# Patient Record
Sex: MALE | Race: WHITE | Employment: FULL TIME | URBAN - METROPOLITAN AREA
[De-identification: names, ages, dates, MRNs, and addresses within clinical notes are randomized per-mention and may not be internally consistent; named-entity substitution may affect disease eponyms.]

---

## 2020-08-11 ENCOUNTER — OFFICE VISIT (OUTPATIENT)
Dept: ORTHOPEDIC SURGERY | Age: 25
End: 2020-08-11
Payer: COMMERCIAL

## 2020-08-11 VITALS — HEIGHT: 72 IN | BODY MASS INDEX: 29.47 KG/M2 | WEIGHT: 217.6 LBS

## 2020-08-11 PROCEDURE — L3670 SO ACRO/CLAV CAN WEB PRE OTS: HCPCS | Performed by: ORTHOPAEDIC SURGERY

## 2020-08-11 PROCEDURE — 99203 OFFICE O/P NEW LOW 30 MIN: CPT | Performed by: ORTHOPAEDIC SURGERY

## 2020-08-11 RX ORDER — DICLOFENAC SODIUM 75 MG/1
75 TABLET, DELAYED RELEASE ORAL 2 TIMES DAILY
Qty: 60 TABLET | Refills: 0 | Status: SHIPPED | OUTPATIENT
Start: 2020-08-11 | End: 2020-09-03 | Stop reason: ALTCHOICE

## 2020-08-11 RX ORDER — METHYLPREDNISOLONE 4 MG/1
TABLET ORAL
COMMUNITY
Start: 2020-08-06 | End: 2020-09-03 | Stop reason: ALTCHOICE

## 2020-08-11 NOTE — PROGRESS NOTES
SHOULDER CONSULTATION    Referring Provider: Self-referred    Primary Care Provider: Not listed    Chief Complaint    Pain (Left shoulder - catcher, stopped runner 2 years ago)      History of Present Illness:  Christina Brannon is a 27 y.o. male who presents today with a 2 years of a quite severe recalcitrant left shoulder pain. Began after high-energy collision while playing catcher at [employer]. He has a feelings of instability. Catching and crepitation. Post activity soreness. Difficulty positioning to comfort at night. Symptoms affecting simply activities of daily living. Primarily in anterior referred pattern of pain. Apprehension with abduction external rotation. No neurologic symptoms. Pain Assessment  Location of Pain: Shoulder  Location Modifiers: Left  Severity of Pain: 9  Quality of Pain: Popping, Dull, Aching  Duration of Pain: Persistent  Frequency of Pain: Intermittent  Date Pain First Started: (2 years)  Aggravating Factors: Bending, Exercise  Limiting Behavior: Yes  Result of Injury: Yes  Work-Related Injury: No  Are there other pain locations you wish to document?: No    Medical History:  Patient's medications, allergies, past medical, surgical, social and family histories were reviewed and updated as appropriate. Review of Systems:  Pertinent items are noted in HPI  Review of systems reviewed from Patient History Form dated on August 11 and available in the patient's chart under the Media tab. Vitals:    08/11/20 0919   Weight: 217 lb 9.6 oz (98.7 kg)   Height: 6' (1.829 m)           General Exam:   Constitutional: Patient is adequately groomed with no evidence of malnutrition  Mental Status: The patient is oriented to time, place and person. The patient's mood and affect are appropriate. Vascular: Examination reveals no swelling or calf tenderness. Peripheral pulses are palpable and 2+. Neurological: The patient has good coordination.   There is no weakness or and fit on 8/11/2020. The patient was educated and fit by a healthcare professional with expert knowledge and specialization in brace application while under the direct supervision of the treating physician. Verbal and written instructions for the use of and application of this item were provided. They were instructed to contact the office immediately should the brace result in increased pain, decreased sensation, increased swelling or worsening of the condition. Treatment Plan: I recommended that he obtain an MRI to better delineate his specific intra-articular pathology as this will help direct our pattern of care. My clinical suspicion is that he is going to require arthroscopy with debridement, removal of loose bodies and capsule labral repair. We appreciate the opportunity to care for this patient. The trust that is implicit in this referral does not go unnoticed. We will do our very best to provide high-quality care that goes above and beyond standards. Please feel free contact us with any questions or concerns about this patient. [facility] of [facility] and Sports Medicine Surgery     This dictation was performed with a verbal recognition program (DRAGON) and it was checked for errors. It is possible that there are still dictated errors within this office note. If so, please bring any errors to my attention for an addendum. All efforts were made to ensure that this office note is accurate.

## 2020-08-12 ENCOUNTER — TELEPHONE (OUTPATIENT)
Dept: ORTHOPEDIC SURGERY | Age: 25
End: 2020-08-12

## 2020-08-12 NOTE — TELEPHONE ENCOUNTER
LM FOR PATIENT REGARDING MRI. MRI APPROVED ([address]) AND READY TO BE SCHEDULED AT [facility]. PATIENT IS AWARE TO CALL TO MAKE AN APPT TO REVIEW THE RESULTS.

## 2020-08-24 ENCOUNTER — HOSPITAL ENCOUNTER (OUTPATIENT)
Dept: MRI IMAGING | Age: 25
Discharge: HOME | End: 2020-08-24
Payer: COMMERCIAL

## 2020-08-24 PROCEDURE — 73221 MRI JOINT UPR EXTREM W/O DYE: CPT

## 2020-08-28 ENCOUNTER — OFFICE VISIT (OUTPATIENT)
Dept: ORTHOPEDIC SURGERY | Age: 25
End: 2020-08-28
Payer: COMMERCIAL

## 2020-08-28 VITALS — WEIGHT: 217 LBS | BODY MASS INDEX: 29.39 KG/M2 | HEIGHT: 72 IN

## 2020-08-28 PROCEDURE — 99214 OFFICE O/P EST MOD 30 MIN: CPT | Performed by: ORTHOPAEDIC SURGERY

## 2020-08-28 RX ORDER — TRAMADOL HYDROCHLORIDE 50 MG/1
50 TABLET ORAL EVERY 8 HOURS PRN
Qty: 18 TABLET | Refills: 0 | Status: SHIPPED | OUTPATIENT
Start: 2020-08-28 | End: 2020-09-04

## 2020-08-28 NOTE — LETTER
[address] [place] [address]              [x] [phone] [facility]  Fax# [phone]                                                     [] [facility]#[phone]                                      [][facility] [address] [place] [address]#[phone]    Scheduled Date: 9/10/2020    Scheduled Time: 1:45pm      Time Needed: 60min    Patient Information:      Name: Steffi Latham      YOB: 1995      SSN:         Gender:  male                            Address: [address] [place] [address]   Phone Number: [phone] (home)     Insurance:  Payor: Billlibby Lose / Plan: Billey Lose NAP CHOICE POS II / Product Type: *No Product type* /     Primary Care Physician:  No primary care provider on file. H&P To Be Completed By: Dr. Socorro Whitt Needed? [] Yes [x] No   Pacemaker/Cardiac Implant? [] Yes [x] No    Surgical Procedure: Left shoulder arthroscopy with anterior labral reconstruction and loose body removal   CPT Code:17037, [id number]    Pre- Op Diagnosis:   1. Shoulder instability, left      Diagnosis Code: U25.902    Rep: Arthrex     Notified: [x] Yes [] No    Special Equipment: Lateral position      [] Mini C-ARM   [] Large  C-ARM     Patient Status:   [x] Outpatient         [] Same Day Admission      [] In- Patient          []Day Admit    Anesthesia Type:    [x] General         [] MAC       [] IV Regional          [] Local          [x] ISB Block      Allergies:  No Known Allergies  Latex: [] Yes [x] No     Vitals:    20 0745   Weight: 217 lb (98.4 kg)   Height: 6' (1.829 m)                                                                  PRE-SURGICAL PHYSICIAN ORDERS    Patient Name Steffi Latham     1995       Allergies Patient has no known allergies.             Pre-surgery Testing Orders:   [] Pre-surgical Anesthesia Orders Per Anesthesiologist    Preop Antibiotic Prophylaxis / DAY OF SURGERY Ancef  IVPB per weight base protocol prior to surgery. Ancef 2 grams if less than or equal to 119.9 kg (263 lbs). Ancef 3 grams if greater than or equal to 120 kg ( 264 lbs ). Pediatric patients (less than 40 kg / 90 lbs) Ancef 30 mg/kg. If allergic to PCN or Cephalosporins give:    Clindamycin 900 mg IVPB          If allergic to PCN or Cephalosporins, MRSA positive, or over age 72 give. Vancomycin 1 gram IVPB if 176 lbs ( 80 kg ) or less. Vancomycin 1.5 grams  IVPB if 177-264 lbs ( 81 - 120 kg ). Vancomycin 2 grams IVPB if over 264 lbs ( over 120 kg ). If allergic to Cephalosporins,PCN and Vancomycin give Cleocin 900 mg IVPB. ADDITIONAL MEDICATIONS:   [x] Acetaminophen 1000 mg by mouth 1 hour prior to procedure   [x] Celebrex 400 mg po 1 hour prior to procedure (18+)     []  EXPAREL 1.3%  20 cc  Subcutaneous    **[place] AND DELIA ONLY**  Ortho Irrigation: Bacitracin 50,999 units and Polymixin B 500,000 unites mixed in a syringe.  OR to mix with 500 cc NS and use 200 cc for irrigation (FOR ALL PATIENTS WHO REQUIRED METAL IMPLANT OR GRAFT)   DVT PREVENTION:  [x]  Knee high BRAULIO Hose           Signature                                                 Date 8/28/20 10:09 AM                                             Irving Ramirez M.D                                                                                                 Scheduled By:  Michela Maguire 8/28/20   Direct Tel: [phone]                     Direct Fax: [phone]

## 2020-08-28 NOTE — PROGRESS NOTES
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 History of instability episodes and high-energy collisions. Presented with feelings of instability crepitation and pain. MRI to evaluate capsule labral pathology and possible loose bodies. He reports that the MRI was quite difficult. He has been in significant pain up to 8 out of 10. He is quite apprehensive on movement. Previously seen on August 11      Objective:     @IPVITALS(24)@    Review of Systems  Pertinent items are noted in HPI  Denies fever, chills, confusion, bowel/bladder active change. Review of systems reviewed from Patient History Form dated on August 28 and available in the patient's chart under the Media tab. Pain Assessment  Location of Pain: Shoulder  Location Modifiers: Left  Severity of Pain: 9  Quality of Pain: Dull, Aching, Throbbing  Duration of Pain: Persistent  Frequency of Pain: Constant    Exam: On exam today he continues to demonstrate positive apprehension, positive dynamic labral shear. Positive Jobes and [place]'s tests. Imaging: Careful review of his MRI is somewhat limited by motion artifact and clarity. There does appear to be a suggestion of some anterior capsule labral step-off not mentioned by the radiologist.  There is some inflammatory signal in the axilla. Difficult to follow the course of the intra-articular long head biceps but a linear signal in the superior labrum was appreciated. No rotator cuff pathology. No other osseous pathology    Office Procedures:      Assessment:     27-year-old gentleman with a history of high-energy collision, overhead athletics and significant instability mechanical symptoms of the shoulder. Underwhelming MRI however limited by motion artifact. Plan:      1: We had a quite lengthy visit today about this clinical situation.   It is clear that Debbie Streeter has a considerable pain and apprehension. He reports quite classical mechanical symptoms. At this point based upon the clinical presentation I think we have 2 options    The first would be an aggressive program of anti-inflammatory management and glenohumeral stabilization therapy    The second would be a diagnostic and therapeutic arthroscopy with repair of the capsule labral structures, removal of likely loose body, potential biceps tenodesis    Based upon the intensity and duration of symptoms. 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 We discussed the procedure in great detail as well as risks benefits and alternatives. We discussed the postoperative rehabilitation as well as specifically the possible persistence of pain    Informed consent was discussed with the patient. This included a detailed description of the procedure. Risks, benefits and alternatives specific to this diagnosis and procedure were outlined. Standard surgical risks of anesthesia, bleeding, nerve damage, infection, need for further surgeries, disability and death also outlined. Verbal confirmation of informed consent was obtained. Signature obtained by the staff. [facility] face to face with the patient discussing and answering questions regarding the risks, benefits, and complications of capsule labral surgery in detail. We talked about the importance of postoperative therapy         Follow-Up Visit: Postoperatively            [facility] Partner of [facility] and Sports Medicine Surgery      This dictation was performed with a verbal recognition program (DRAGON) and it was checked for errors.  It is possible that there are still dictated errors within this office note. If so, please bring any errors to my attention for an addendum. All efforts were made to ensure that this office note is accurate.

## 2020-08-28 NOTE — LETTER
[address] [facility] Sports Medicine   Surgery Precert & Billing Form:    DEMOGRAPHICS:                                                                                                       Patient Name:  Idalmis Blair  Patient :  1995   Patient SS#:      Patient Phone:  [phone] (home)  Alt. Patient Phone:    Patient Address:  [address] [facility] [address]    PCP:  No primary care provider on file. Insurance: Payor: Linda Law / Plan: Linda Law NAP CHOICE POS II / Product Type: *No Product type* /     DIAGNOSIS & PROCEDURE:                                                                                      Diagnosis:   1.  Shoulder instability, left      Diagnosis Code:  T24.261      Operation: left    SURGERY  INFORMATION  Date of Surgery:   20  Location:  [facility]   Type:    Outpatient  23 hour hold:  No   Surgeon:              Ramo Bardales MD      20     BILLING INFORMATION:                                                                                                Physician Procedure                                            CPT Codes        Left shoulder arthroscopy with anterior labral reconstruction and loose body removal  10683, 04919                      PA, or Fellow Procedure                                      CPT Codes                     Precert information:

## 2020-08-28 NOTE — LETTER
[facility] [address]  Phone: [phone]  Fax: [phone]    Lucie Becerril MD        August 28, 2020     Patient: Argelia Puente   YOB: 1995   Date of Visit: 8/28/2020       To Whom It May Concern: It is my medical opinion that Argelia Puente may return to light duty immediately with the following restrictions: lifting/carrying not to exceed 5 lbs. , pushing/pulling not to exceed 5 lbs., no overhead work, Duration of restrictions (days):  19 days. Amber Hernandez is scheduled for surgery on 9/16/2020 and will need to remain out of work until October 7th, 2020. If you have any questions or concerns, please don't hesitate to call.     Sincerely,        Lucie Becerril MD

## 2020-08-31 ENCOUNTER — TELEPHONE (OUTPATIENT)
Dept: ORTHOPEDIC SURGERY | Age: 25
End: 2020-08-31

## 2020-08-31 NOTE — TELEPHONE ENCOUNTER
Auth: # NPR  Ref # [id number]    Date: 09/16/2020  Type of SX:  OP  Location: [facility]  CPT: 28971   15537   DX Code: A64.875  SX area: Left shoulder arthorscopy  Insurance: Ignacio Layer

## 2020-09-11 ENCOUNTER — OFFICE VISIT (OUTPATIENT)
Dept: PRIMARY CARE CLINIC | Age: 25
End: 2020-09-11
Payer: COMMERCIAL

## 2020-09-11 PROCEDURE — 99211 OFF/OP EST MAY X REQ PHY/QHP: CPT | Performed by: NURSE PRACTITIONER

## 2020-09-11 NOTE — PROGRESS NOTES
Enid Eduardo received a viral test for COVID-19. They were educated on isolation and quarantine as appropriate. For any symptoms, they were directed to seek care from their PCP, given contact information to establish with a doctor, directed to an urgent care or the emergency room.

## 2020-09-11 NOTE — PATIENT INSTRUCTIONS

## 2020-09-12 LAB — SARS-COV-2, NAA: NOT DETECTED

## 2020-09-15 ENCOUNTER — ANESTHESIA EVENT (OUTPATIENT)
Dept: OPERATING ROOM | Age: 25
End: 2020-09-15
Payer: COMMERCIAL

## 2020-09-16 ENCOUNTER — ANESTHESIA (OUTPATIENT)
Dept: OPERATING ROOM | Age: 25
End: 2020-09-16
Payer: COMMERCIAL

## 2020-09-16 ENCOUNTER — HOSPITAL ENCOUNTER (OUTPATIENT)
Age: 25
Setting detail: OUTPATIENT SURGERY
Discharge: HOME | End: 2020-09-16
Attending: ORTHOPAEDIC SURGERY | Admitting: ORTHOPAEDIC SURGERY
Payer: COMMERCIAL

## 2020-09-16 VITALS
HEART RATE: 82 BPM | BODY MASS INDEX: 29.39 KG/M2 | OXYGEN SATURATION: 100 % | HEIGHT: 72 IN | TEMPERATURE: 98.4 F | RESPIRATION RATE: 16 BRPM | SYSTOLIC BLOOD PRESSURE: 145 MMHG | DIASTOLIC BLOOD PRESSURE: 81 MMHG | WEIGHT: 217 LBS

## 2020-09-16 VITALS
OXYGEN SATURATION: 100 % | TEMPERATURE: 98.6 F | RESPIRATION RATE: 6 BRPM | DIASTOLIC BLOOD PRESSURE: 81 MMHG | SYSTOLIC BLOOD PRESSURE: 124 MMHG

## 2020-09-16 PROCEDURE — 2500000003 HC RX 250 WO HCPCS: Performed by: NURSE ANESTHETIST, CERTIFIED REGISTERED

## 2020-09-16 PROCEDURE — 2500000003 HC RX 250 WO HCPCS: Performed by: ANESTHESIOLOGY

## 2020-09-16 PROCEDURE — 64415 NJX AA&/STRD BRCH PLXS IMG: CPT | Performed by: ANESTHESIOLOGY

## 2020-09-16 PROCEDURE — 2709999900 HC NON-CHARGEABLE SUPPLY: Performed by: ORTHOPAEDIC SURGERY

## 2020-09-16 PROCEDURE — 6360000002 HC RX W HCPCS: Performed by: NURSE ANESTHETIST, CERTIFIED REGISTERED

## 2020-09-16 PROCEDURE — 2580000003 HC RX 258: Performed by: ANESTHESIOLOGY

## 2020-09-16 PROCEDURE — 7100000010 HC PHASE II RECOVERY - FIRST 15 MIN: Performed by: ORTHOPAEDIC SURGERY

## 2020-09-16 PROCEDURE — 3700000001 HC ADD 15 MINUTES (ANESTHESIA): Performed by: ORTHOPAEDIC SURGERY

## 2020-09-16 PROCEDURE — 2580000003 HC RX 258: Performed by: ORTHOPAEDIC SURGERY

## 2020-09-16 PROCEDURE — 7100000000 HC PACU RECOVERY - FIRST 15 MIN: Performed by: ORTHOPAEDIC SURGERY

## 2020-09-16 PROCEDURE — 7100000011 HC PHASE II RECOVERY - ADDTL 15 MIN: Performed by: ORTHOPAEDIC SURGERY

## 2020-09-16 PROCEDURE — 6360000002 HC RX W HCPCS: Performed by: ORTHOPAEDIC SURGERY

## 2020-09-16 PROCEDURE — 3700000000 HC ANESTHESIA ATTENDED CARE: Performed by: ORTHOPAEDIC SURGERY

## 2020-09-16 PROCEDURE — 3600000004 HC SURGERY LEVEL 4 BASE: Performed by: ORTHOPAEDIC SURGERY

## 2020-09-16 PROCEDURE — C1713 ANCHOR/SCREW BN/BN,TIS/BN: HCPCS | Performed by: ORTHOPAEDIC SURGERY

## 2020-09-16 PROCEDURE — 6360000002 HC RX W HCPCS: Performed by: ANESTHESIOLOGY

## 2020-09-16 PROCEDURE — 3600000014 HC SURGERY LEVEL 4 ADDTL 15MIN: Performed by: ORTHOPAEDIC SURGERY

## 2020-09-16 PROCEDURE — 6370000000 HC RX 637 (ALT 250 FOR IP): Performed by: ORTHOPAEDIC SURGERY

## 2020-09-16 PROCEDURE — 7100000001 HC PACU RECOVERY - ADDTL 15 MIN: Performed by: ORTHOPAEDIC SURGERY

## 2020-09-16 DEVICE — ANCHOR SUT L15.5MM DIA2.9MM BIOCOMPOSITE PUSHLOCK: Type: IMPLANTABLE DEVICE | Site: SHOULDER | Status: FUNCTIONAL

## 2020-09-16 RX ORDER — LIDOCAINE HYDROCHLORIDE 20 MG/ML
INJECTION, SOLUTION EPIDURAL; INFILTRATION; INTRACAUDAL; PERINEURAL
Status: DISCONTINUED
Start: 2020-09-16 | End: 2020-09-16 | Stop reason: HOSPADM

## 2020-09-16 RX ORDER — ONDANSETRON 2 MG/ML
INJECTION INTRAMUSCULAR; INTRAVENOUS PRN
Status: DISCONTINUED | OUTPATIENT
Start: 2020-09-16 | End: 2020-09-16 | Stop reason: SDUPTHER

## 2020-09-16 RX ORDER — SODIUM CHLORIDE 0.9 % (FLUSH) 0.9 %
10 SYRINGE (ML) INJECTION EVERY 12 HOURS SCHEDULED
Status: DISCONTINUED | OUTPATIENT
Start: 2020-09-16 | End: 2020-09-16 | Stop reason: HOSPADM

## 2020-09-16 RX ORDER — MORPHINE SULFATE 2 MG/ML
2 INJECTION, SOLUTION INTRAMUSCULAR; INTRAVENOUS EVERY 5 MIN PRN
Status: DISCONTINUED | OUTPATIENT
Start: 2020-09-16 | End: 2020-09-16 | Stop reason: HOSPADM

## 2020-09-16 RX ORDER — PROPOFOL 10 MG/ML
INJECTION, EMULSION INTRAVENOUS PRN
Status: DISCONTINUED | OUTPATIENT
Start: 2020-09-16 | End: 2020-09-16 | Stop reason: SDUPTHER

## 2020-09-16 RX ORDER — CELECOXIB 200 MG/1
400 CAPSULE ORAL ONCE
Status: COMPLETED | OUTPATIENT
Start: 2020-09-16 | End: 2020-09-16

## 2020-09-16 RX ORDER — EPINEPHRINE 1 MG/ML
INJECTION, SOLUTION, CONCENTRATE INTRAVENOUS
Status: DISCONTINUED
Start: 2020-09-16 | End: 2020-09-16 | Stop reason: HOSPADM

## 2020-09-16 RX ORDER — DEXAMETHASONE SODIUM PHOSPHATE 4 MG/ML
INJECTION, SOLUTION INTRA-ARTICULAR; INTRALESIONAL; INTRAMUSCULAR; INTRAVENOUS; SOFT TISSUE PRN
Status: DISCONTINUED | OUTPATIENT
Start: 2020-09-16 | End: 2020-09-16 | Stop reason: SDUPTHER

## 2020-09-16 RX ORDER — ACETAMINOPHEN 500 MG
1000 TABLET ORAL ONCE
Status: COMPLETED | OUTPATIENT
Start: 2020-09-16 | End: 2020-09-16

## 2020-09-16 RX ORDER — BUPIVACAINE HYDROCHLORIDE 5 MG/ML
INJECTION, SOLUTION EPIDURAL; INTRACAUDAL
Status: DISCONTINUED
Start: 2020-09-16 | End: 2020-09-16 | Stop reason: HOSPADM

## 2020-09-16 RX ORDER — MORPHINE SULFATE 2 MG/ML
1 INJECTION, SOLUTION INTRAMUSCULAR; INTRAVENOUS EVERY 5 MIN PRN
Status: DISCONTINUED | OUTPATIENT
Start: 2020-09-16 | End: 2020-09-16 | Stop reason: HOSPADM

## 2020-09-16 RX ORDER — OXYCODONE HYDROCHLORIDE AND ACETAMINOPHEN 5; 325 MG/1; MG/1
1 TABLET ORAL PRN
Status: DISCONTINUED | OUTPATIENT
Start: 2020-09-16 | End: 2020-09-16 | Stop reason: HOSPADM

## 2020-09-16 RX ORDER — ONDANSETRON 2 MG/ML
4 INJECTION INTRAMUSCULAR; INTRAVENOUS
Status: DISCONTINUED | OUTPATIENT
Start: 2020-09-16 | End: 2020-09-16 | Stop reason: HOSPADM

## 2020-09-16 RX ORDER — LIDOCAINE HYDROCHLORIDE 10 MG/ML
INJECTION, SOLUTION EPIDURAL; INFILTRATION; INTRACAUDAL; PERINEURAL
Status: DISCONTINUED
Start: 2020-09-16 | End: 2020-09-16 | Stop reason: HOSPADM

## 2020-09-16 RX ORDER — OXYCODONE HYDROCHLORIDE AND ACETAMINOPHEN 5; 325 MG/1; MG/1
1 TABLET ORAL EVERY 6 HOURS PRN
Qty: 28 TABLET | Refills: 0 | Status: SHIPPED | OUTPATIENT
Start: 2020-09-16 | End: 2020-09-21 | Stop reason: SDUPTHER

## 2020-09-16 RX ORDER — KETOROLAC TROMETHAMINE 30 MG/ML
INJECTION, SOLUTION INTRAMUSCULAR; INTRAVENOUS
Status: DISCONTINUED
Start: 2020-09-16 | End: 2020-09-16 | Stop reason: HOSPADM

## 2020-09-16 RX ORDER — MIDAZOLAM HYDROCHLORIDE 1 MG/ML
INJECTION INTRAMUSCULAR; INTRAVENOUS
Status: DISCONTINUED
Start: 2020-09-16 | End: 2020-09-16 | Stop reason: HOSPADM

## 2020-09-16 RX ORDER — LIDOCAINE HYDROCHLORIDE 20 MG/ML
INJECTION, SOLUTION INFILTRATION; PERINEURAL PRN
Status: DISCONTINUED | OUTPATIENT
Start: 2020-09-16 | End: 2020-09-16 | Stop reason: SDUPTHER

## 2020-09-16 RX ORDER — SODIUM CHLORIDE 0.9 % (FLUSH) 0.9 %
10 SYRINGE (ML) INJECTION PRN
Status: DISCONTINUED | OUTPATIENT
Start: 2020-09-16 | End: 2020-09-16 | Stop reason: HOSPADM

## 2020-09-16 RX ORDER — SODIUM CHLORIDE, SODIUM LACTATE, POTASSIUM CHLORIDE, CALCIUM CHLORIDE 600; 310; 30; 20 MG/100ML; MG/100ML; MG/100ML; MG/100ML
INJECTION, SOLUTION INTRAVENOUS CONTINUOUS
Status: DISCONTINUED | OUTPATIENT
Start: 2020-09-16 | End: 2020-09-16 | Stop reason: HOSPADM

## 2020-09-16 RX ORDER — FENTANYL CITRATE 50 UG/ML
INJECTION, SOLUTION INTRAMUSCULAR; INTRAVENOUS PRN
Status: DISCONTINUED | OUTPATIENT
Start: 2020-09-16 | End: 2020-09-16 | Stop reason: SDUPTHER

## 2020-09-16 RX ORDER — KETOROLAC TROMETHAMINE 30 MG/ML
30 INJECTION, SOLUTION INTRAMUSCULAR; INTRAVENOUS ONCE
Status: COMPLETED | OUTPATIENT
Start: 2020-09-16 | End: 2020-09-16

## 2020-09-16 RX ORDER — ROCURONIUM BROMIDE 10 MG/ML
INJECTION, SOLUTION INTRAVENOUS PRN
Status: DISCONTINUED | OUTPATIENT
Start: 2020-09-16 | End: 2020-09-16 | Stop reason: SDUPTHER

## 2020-09-16 RX ORDER — MIDAZOLAM HYDROCHLORIDE 1 MG/ML
INJECTION INTRAMUSCULAR; INTRAVENOUS PRN
Status: DISCONTINUED | OUTPATIENT
Start: 2020-09-16 | End: 2020-09-16 | Stop reason: SDUPTHER

## 2020-09-16 RX ORDER — BUPIVACAINE HYDROCHLORIDE 5 MG/ML
INJECTION, SOLUTION EPIDURAL; INTRACAUDAL PRN
Status: DISCONTINUED | OUTPATIENT
Start: 2020-09-16 | End: 2020-09-16 | Stop reason: SDUPTHER

## 2020-09-16 RX ORDER — OXYCODONE HYDROCHLORIDE AND ACETAMINOPHEN 5; 325 MG/1; MG/1
2 TABLET ORAL PRN
Status: DISCONTINUED | OUTPATIENT
Start: 2020-09-16 | End: 2020-09-16 | Stop reason: HOSPADM

## 2020-09-16 RX ADMIN — HYDROMORPHONE HYDROCHLORIDE 0.5 MG: 1 INJECTION, SOLUTION INTRAMUSCULAR; INTRAVENOUS; SUBCUTANEOUS at 15:13

## 2020-09-16 RX ADMIN — DEXAMETHASONE SODIUM PHOSPHATE 8 MG: 4 INJECTION, SOLUTION INTRAMUSCULAR; INTRAVENOUS at 14:12

## 2020-09-16 RX ADMIN — FAMOTIDINE 20 MG: 10 INJECTION, SOLUTION INTRAVENOUS at 12:47

## 2020-09-16 RX ADMIN — MIDAZOLAM 2 MG: 1 INJECTION INTRAMUSCULAR; INTRAVENOUS at 13:30

## 2020-09-16 RX ADMIN — KETOROLAC TROMETHAMINE 30 MG: 30 INJECTION, SOLUTION INTRAMUSCULAR at 15:24

## 2020-09-16 RX ADMIN — SODIUM CHLORIDE, POTASSIUM CHLORIDE, SODIUM LACTATE AND CALCIUM CHLORIDE: 600; 310; 30; 20 INJECTION, SOLUTION INTRAVENOUS at 13:57

## 2020-09-16 RX ADMIN — BUPIVACAINE HYDROCHLORIDE 25 ML: 5 INJECTION, SOLUTION EPIDURAL; INTRACAUDAL; PERINEURAL at 13:35

## 2020-09-16 RX ADMIN — SUGAMMADEX 200 MG: 100 INJECTION, SOLUTION INTRAVENOUS at 14:57

## 2020-09-16 RX ADMIN — LIDOCAINE HYDROCHLORIDE 100 MG: 20 INJECTION, SOLUTION INFILTRATION; PERINEURAL at 14:04

## 2020-09-16 RX ADMIN — ACETAMINOPHEN 1000 MG: 500 TABLET, FILM COATED ORAL at 12:47

## 2020-09-16 RX ADMIN — Medication 2 G: at 14:13

## 2020-09-16 RX ADMIN — CELECOXIB 400 MG: 200 CAPSULE ORAL at 12:48

## 2020-09-16 RX ADMIN — FENTANYL CITRATE 100 MCG: 50 INJECTION INTRAMUSCULAR; INTRAVENOUS at 14:04

## 2020-09-16 RX ADMIN — PROPOFOL 200 MG: 10 INJECTION, EMULSION INTRAVENOUS at 14:06

## 2020-09-16 RX ADMIN — ROCURONIUM BROMIDE 50 MG: 10 INJECTION, SOLUTION INTRAVENOUS at 14:06

## 2020-09-16 RX ADMIN — ONDANSETRON 4 MG: 2 INJECTION, SOLUTION INTRAMUSCULAR; INTRAVENOUS at 14:12

## 2020-09-16 RX ADMIN — SODIUM CHLORIDE, POTASSIUM CHLORIDE, SODIUM LACTATE AND CALCIUM CHLORIDE: 600; 310; 30; 20 INJECTION, SOLUTION INTRAVENOUS at 12:49

## 2020-09-16 ASSESSMENT — PULMONARY FUNCTION TESTS
PIF_VALUE: 16
PIF_VALUE: 20
PIF_VALUE: 22
PIF_VALUE: 22
PIF_VALUE: 23
PIF_VALUE: 22
PIF_VALUE: 22
PIF_VALUE: 15
PIF_VALUE: 22
PIF_VALUE: 22
PIF_VALUE: 1
PIF_VALUE: 22
PIF_VALUE: 1
PIF_VALUE: 21
PIF_VALUE: 22
PIF_VALUE: 16
PIF_VALUE: 1
PIF_VALUE: 1
PIF_VALUE: 22
PIF_VALUE: 22
PIF_VALUE: 15
PIF_VALUE: 0
PIF_VALUE: 16
PIF_VALUE: 22
PIF_VALUE: 20
PIF_VALUE: 4
PIF_VALUE: 1
PIF_VALUE: 22
PIF_VALUE: 23
PIF_VALUE: 6
PIF_VALUE: 22
PIF_VALUE: 22
PIF_VALUE: 2
PIF_VALUE: 17
PIF_VALUE: 22
PIF_VALUE: 17
PIF_VALUE: 23
PIF_VALUE: 22
PIF_VALUE: 22
PIF_VALUE: 21
PIF_VALUE: 15
PIF_VALUE: 14
PIF_VALUE: 16
PIF_VALUE: 21
PIF_VALUE: 22
PIF_VALUE: 23
PIF_VALUE: 22
PIF_VALUE: 16
PIF_VALUE: 1
PIF_VALUE: 22
PIF_VALUE: 5
PIF_VALUE: 22
PIF_VALUE: 21
PIF_VALUE: 22
PIF_VALUE: 1
PIF_VALUE: 22

## 2020-09-16 ASSESSMENT — LIFESTYLE VARIABLES: SMOKING_STATUS: 0

## 2020-09-16 ASSESSMENT — PAIN SCALES - GENERAL
PAINLEVEL_OUTOF10: 7
PAINLEVEL_OUTOF10: 7
PAINLEVEL_OUTOF10: 6
PAINLEVEL_OUTOF10: 7

## 2020-09-16 ASSESSMENT — PAIN DESCRIPTION - LOCATION
LOCATION: SHOULDER
LOCATION: SHOULDER

## 2020-09-16 ASSESSMENT — PAIN - FUNCTIONAL ASSESSMENT: PAIN_FUNCTIONAL_ASSESSMENT: 0-10

## 2020-09-16 ASSESSMENT — ENCOUNTER SYMPTOMS: SHORTNESS OF BREATH: 0

## 2020-09-16 ASSESSMENT — PAIN DESCRIPTION - ORIENTATION
ORIENTATION: LEFT
ORIENTATION: LEFT

## 2020-09-16 NOTE — OP NOTE
Operative Note      Patient: Elyse Deshpande  YOB: 1995  MRN: [id number]    Date of Procedure: 9/16/2020    Pre-Op Diagnosis: SHOULDER INSTABILITY, LEFT    Post-Op Diagnosis: Posterior labral tear, degenerative superior labral tear, low-grade articular sided fraying along the articular surface of the supraspinatus consistent with an i internal impingement syndrome       Procedure(s):  LEFT SHOULDER VIDEO ARTHROSCOPY WITH POSTERIOR LABRAL RECONSTRUCTION. LIMITED DEBRIDEMENT ROTATOR CUFF. #1.  Left shoulder arthroscopy with posterior labral repair; 36425  #2. Left shoulder arthroscopy with a limited debridement including the articular surface of the supraspinatus and superior labrum;  92438          Surgeon(s):  Kolton Moses MD    Assistant:   Surgical Assistant: Radha Pinedo    Anesthesia: General    Estimated Blood Loss (mL): less than 50     Complications: None    Specimens:   * No specimens in log *    Implants:  Implant Name Type Inv. Item Serial No.  Lot No. LRB No. 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 He had a long history of difficulty with his shoulder. He said several traumatic accidents. Recently began to develop sharp catching mechanical pains in the shoulder. The feeling of instability but no true dislocations or reduction maneuvers required. He had pain along both anterior and posterior aspects of the shoulder. He underwent an MRI which was suggestive of some posterior labral pathology and potentially a small loose body. He was not improving with a conservative course of care and decided to undergo at his elective choice diagnostic and therapeutic arthroscopy.   Intent was to help provide relief from his mechanical sharp catching symptoms. He accepted the intrinsic risks of surgery. We discussed the possibility of postoperative stiffness and prolonged rehabilitation. Informed consent was discussed with the patient. This included a detailed description of the procedure. Risks, benefits and alternatives specific to this diagnosis and procedure were outlined. Standard surgical risks of anesthesia, bleeding, nerve damage, infection, need for further surgeries, disability and death also outlined. Verbal confirmation of informed consent was obtained. Signature obtained by the staff. Patient was identified marked. Informed consent confirmed. Come to the op suite. Placed supine but after the induction of anesthesia was carefully padded positioned in a lateral decubitus position. Exam under anesthesia demonstrated good range movement, no instability on anterior posterior load shift or sulcus testing. Skin good condition. Meticulous sterile prep and drape. Surgical timeout. Anatomic landmarks marked. Pneumatic arm sandoval in the 30 degree 30 degree position with a bump in the axilla    Posterior portal was created sharply dilated bluntly. Joint was insufflated. Surfaces were in reasonably good condition. There was some grade 1 fissuring on the glenoid. Subscapularis was clearly intact. There was some obvious fraying along the articular surface of the  Supraspinatus. There was a degenerative appearing superior labral tear with a unstable flap and very subtle peelback with some cracking of the supra barrier posterior cartilage. There was a fissure like him tear extending posteriorly down to the insertion of the posterior inferior glenohumeral ligament. There was no avulsion of the glenohumeral ligaments off the humerus. The anterior labrum was somewhat diminutive and patulous in the capsule however there was no evidence of jonas scuffing or traumatic tearing in this area.   Please note he did have a Dedrick complex middle glenohumeral ligament. Subscapularis was intact. I very carefully evaluated the long head biceps pulling it out of the groove into the joint and evaluating it. There was no significant longitudinal tearing or synovitic changes. As such given his young age I felt that preservation of the long head biceps was warranted. I debrided the superior labrum down to a stable base and provided some very gentle coagulation. I move my camera anteriorly. I elevated the posterior cam lesion. I gently scuffed up the cartilage in this area. I placed 2 labral tape anchors at 10:00 and 8:00 positions. I gathered a small 4 mm imbrication of posterior capsule and I rolled the posterior labrum nicely onto the rim with a 45 degree push lock anchor insertion in both places. This really stabilize the superior and inferior labrum as well as creating posterior stability and addressing all tear. Was stable to rotation and probing. I took the arm in a traction and obtained the 9090 cocking position to see that there was no persistent frayed tissue impinging. E we had smoothed with the shaver the supraspinatus aggressively and I would note that this represented a less than 50% of the footprint or insertion. At this point we felt we addressed the structural pathology. We evacuated shoulder fluid. Closed portals with nylon. Soft dressing and sling. Incision versus [employer].         Lectronically signed by Sherlyn Green MD on 9/16/2020 at 3:05 PM

## 2020-09-16 NOTE — BRIEF OP NOTE
Brief Postoperative Note      Patient: Kirstin Morales  YOB: 1995  MRN: [id number]    Date of Procedure: 9/16/2020    Pre-Op Diagnosis: SHOULDER INSTABILITY, LEFT    Post-Op Diagnosis: Same       Procedure(s):  LEFT SHOULDER VIDEO ARTHROSCOPY WITH POSTERIOR LABRAL RECONSTRUCTION. LIMITED DEBRIDEMENT ROTATOR CUFF. Surgeon(s):  Raoul Cazares MD    Assistant:  Surgical Assistant: Jean Krishna    Anesthesia: General    Estimated Blood Loss (mL): less than 50     Complications: None    Specimens:   * No specimens in log *    Implants:  Implant Name Type Inv.  Item Serial No.  Lot No. LRB No. Used Action   ANCHOR Barnie Krabbe 2.9X15.5MM Fastener ANCHOR Barnie Krabbe 2.9X15.5MM  [facility] 14 [id number] Left 2 Implanted         Drains: * No LDAs found *    Findings: please see olp note    Electronically signed by Raoul Cazares MD on 9/16/2020 at 3:05 PM

## 2020-09-16 NOTE — ANESTHESIA PRE PROCEDURE
Department of Anesthesiology  Preprocedure Note       Name:  Lew Favre   Age:  23 y.o.  :  1995                                          MRN:  [id number]         Date:  2020      Surgeon: Charla Degree):  Lisa Nick MD    Procedure: Procedure(s):  LEFT SHOULDER VIDEO ARTHROSCOPY WITH ANTERIOR LABRAL RECONSTRUCTION AND LOOSE BODY REMOVAL --BLOCK--    Medications prior to admission:   Prior to Admission medications    Not on File       Current medications:    Current Facility-Administered Medications   Medication Dose Route Frequency Provider Last Rate Last Dose    ceFAZolin (ANCEF) 2 g in sterile water 20 mL IV syringe  2 g Intravenous Once Lisa Nick MD        lactated ringers infusion   Intravenous Continuous Mari Higuera MD        sodium chloride flush 0.9 % injection 10 mL  10 mL Intravenous 2 times per day Mari Higuera MD        sodium chloride flush 0.9 % injection 10 mL  10 mL Intravenous PRN Mari Higuera MD        famotidine (PEPCID) injection 20 mg  20 mg Intravenous Once Mari Higuera MD        acetaminophen (TYLENOL) tablet 1,000 mg  1,000 mg Oral Once Lisa Nick MD        celecoxib (CELEBREX) capsule 400 mg  400 mg Oral Once Lisa Nick MD           Allergies:  No Known Allergies    Problem List:  There is no problem list on file for this patient. Past Medical History:  History reviewed. No pertinent past medical history. Past Surgical History:  History reviewed. No pertinent surgical history. Social History:    Social History     Tobacco Use    Smoking status: Never Smoker    Smokeless tobacco: Never Used   Substance Use Topics    Alcohol use: Yes     Comment: occas.                                 Counseling given: Not Answered      Vital Signs (Current):   Vitals:    20 1209   BP: 118/85   Pulse: 82   Resp: 16   Temp: 98.2 °F (36.8 °C)   TempSrc: Temporal   SpO2: 98%   Weight: 217 lb (98.4 kg) Height: 6' (1.829 m)                                              BP Readings from Last 3 Encounters:   09/16/20 118/85       NPO Status: Time of last liquid consumption: 0000                        Time of last solid consumption: 0000                        Date of last liquid consumption: 09/16/20                        Date of last solid food consumption: 09/16/20    BMI:   Wt Readings from Last 3 Encounters:   09/16/20 217 lb (98.4 kg)   08/28/20 217 lb (98.4 kg)   08/11/20 217 lb 9.6 oz (98.7 kg)     Body mass index is 29.43 kg/m². CBC: No results found for: WBC, RBC, HGB, HCT, MCV, RDW, PLT    CMP: No results found for: NA, K, CL, CO2, BUN, CREATININE, GFRAA, AGRATIO, LABGLOM, GLUCOSE, PROT, CALCIUM, BILITOT, ALKPHOS, AST, ALT    POC Tests: No results for input(s): POCGLU, POCNA, POCK, POCCL, POCBUN, POCHEMO, POCHCT in the last 72 hours. Coags: No results found for: PROTIME, INR, APTT    HCG (If Applicable): No results found for: PREGTESTUR, PREGSERUM, HCG, HCGQUANT     ABGs: No results found for: PHART, PO2ART, WUW6XZC, HZE4ZWX, BEART, G4VBICAO     Type & Screen (If Applicable):  No results found for: LABABO, LABRH    Drug/Infectious Status (If Applicable):  No results found for: HIV, HEPCAB    COVID-19 Screening (If Applicable):   Lab Results   Component Value Date    COVID19 NOT DETECTED 09/11/2020         Anesthesia Evaluation  Patient summary reviewed no history of anesthetic complications:   Airway: Mallampati: II  TM distance: <3 FB   Neck ROM: full  Mouth opening: > = 3 FB Dental:          Pulmonary:Negative Pulmonary ROS breath sounds clear to auscultation      (-) COPD, asthma, shortness of breath, sleep apnea and not a current smoker          Patient did not smoke on day of surgery.                  Cardiovascular:Negative CV ROS        (-) pacemaker, hypertension, past MI, CAD, CABG/stent, dysrhythmias,  angina and  CHF      Rhythm: regular  Rate: normal           Beta Blocker:  Not on Beta Blocker         Neuro/Psych:   Negative Neuro/Psych ROS     (-) seizures, TIA, CVA and psychiatric history           GI/Hepatic/Renal: Neg GI/Hepatic/Renal ROS       (-) GERD, liver disease, no renal disease and no morbid obesity       Endo/Other: Negative Endo/Other ROS   (+) : arthritis:., no malignancy/cancer. (-) diabetes mellitus, hypothyroidism, hyperthyroidism, blood dyscrasia, no malignancy/cancer               Abdominal:           Vascular:                                        Anesthesia Plan      general     ASA 2     (Preop blk for pop pain assist.,)  Induction: intravenous. MIPS: Postoperative opioids intended and Prophylactic antiemetics administered. Anesthetic plan and risks discussed with patient. Plan discussed with CRNA. This pre-anesthesia assessment may be used as a history and physical.    DOS STAFF ADDENDUM:    Pt seen and examined, chart reviewed (including anesthesia, drug and allergy history). No interval changes to history and physical examination. Anesthetic plan, risks, benefits, alternatives, and personnel involved discussed with patient. Patient verbalized an understanding and agrees to proceed.       Carmelita Toro MD  September 16, 2020  12:30 PM      Carmelita Toro MD   9/16/2020

## 2020-09-16 NOTE — ANESTHESIA PROCEDURE NOTES
Peripheral Block    Patient location during procedure: pre-op  Staffing  Anesthesiologist: Cornelius Fernández MD  Performed: anesthesiologist   Preanesthetic Checklist  Completed: patient identified, site marked, pre-op evaluation, timeout performed, IV checked, risks and benefits discussed, monitors and equipment checked, anesthesia consent given, oxygen available and patient being monitored  Peripheral Block  Patient position: sitting  Prep: ChloraPrep  Patient monitoring: cardiac monitor, continuous pulse ox, frequent blood pressure checks and IV access  Block type: Brachial plexus  Laterality: left  Injection technique: single-shot  Procedures: ultrasound guided  Local infiltration: lidocaine  Infiltration strength: 1 %  Dose: 2 mL  Interscalene  Provider prep: mask and sterile gloves  Local infiltration: lidocaine  Needle  Needle type: combined needle/nerve stimulator   Needle gauge: 21 G  Needle length: 10 cm  Needle localization: anatomical landmarks and ultrasound guidance  Assessment  Injection assessment: negative aspiration for heme and local visualized surrounding nerve on ultrasound  Paresthesia pain: immediately resolved (x2 with needle redirect, instantly resolved and gone on intra and post procedure questioning )  Slow fractionated injection: yes  Hemodynamics: stable  Additional Notes  Consent, time out, iv sed, prep, u/s localization, lido skin and deep, needle, good la spread, mult neg asps, 2 brief rajeev with redirect, instantly resolved(noted), pic taken, pt gabriella well!   Reason for block: post-op pain management and at surgeon's request

## 2020-09-16 NOTE — PROGRESS NOTES
Report from [address] and CRNA. Pt arrived to PACU from OR. See doc flow for vitals and assessment.

## 2020-09-16 NOTE — H&P
Department of Orthopedic Surgery  Attending   History and Physical        CHIEF COMPLAINT: Shoulder pain, labral tear    Reason for Admission: As Above    History Obtained From:  patient    HISTORY OF PRESENT ILLNESS:      The patient is a 23 y.o. male  who presents with plans for elective shoulder arthroscopy. No recent change to health medications       Past Medical History:    History reviewed. No pertinent past medical history. Past Surgical History:    History reviewed. No pertinent surgical history. Medications Prior to Admission:   Prior to Admission medications    Not on File       Allergies:  Patient has no known allergies. Social History:    Tobacco:  reports that he has never smoked. He has never used smokeless tobacco.   Alcohol:  reports current alcohol use. Illicit Drug: No  Family History:   History reviewed. No pertinent family history. REVIEW OF SYSTEMS:  CONSTITUTIONAL:  negative  MUSCULOSKELETAL:  positive for  pain    PHYSICAL EXAM:  Admission weight: 217 lb (98.4 kg)  6' (182.9 cm)  VITALS:  /85   Pulse 82   Temp 98.2 °F (36.8 °C) (Temporal)   Resp 16   Ht 6' (1.829 m)   Wt 217 lb (98.4 kg)   SpO2 98%   BMI 29.43 kg/m²     CONSTITUTIONAL:  awake, alert, cooperative, no apparent distress, and appears stated age  Cardiac; regular rate and rhythm  Pulmonary; clear to auscultation bilaterally  MUSCULOSKELETAL:  There is no redness, warmth, or swelling of the joints. Full range of motion noted. Motor strength is 5 out of 5 all extremities bilaterally.   Tone is normal.    DATA:  CBC: No results found for: WBC, RBC, HGB, HCT, MCV, MCH, MCHC, RDW, PLT, MPV  BMP:  No results found for: NA, K, CL, CO2, BUN, LABALBU, CREATININE, CALCIUM, GFRAA, LABGLOM, GLUCOSE  PT/INR:  No results found for: PROTIME, INR    Radiology:  No orders to display         ASSESSMENT: No contraindications apparent to proceed    PLAN:  1) standard perioperative care  2) informed consent reviewed  3)

## 2020-09-21 NOTE — TELEPHONE ENCOUNTER
Last office visit 8/28/2020    Last written 9/16/22020     Surgery 9/16/2020     Next office visit scheduled 9/24/2020    Requested Prescriptions     Pending Prescriptions Disp Refills    oxyCODONE-acetaminophen (PERCOCET) 5-325 MG per tablet 28 tablet 0     Sig: Take 1 tablet by mouth every 6 hours as needed for Pain for up to 7 days. Intended supply: 7 days.  Take lowest dose possible to manage pain

## 2020-09-22 ENCOUNTER — TELEPHONE (OUTPATIENT)
Dept: ORTHOPEDIC SURGERY | Age: 25
End: 2020-09-22

## 2020-09-22 RX ORDER — OXYCODONE HYDROCHLORIDE AND ACETAMINOPHEN 5; 325 MG/1; MG/1
1 TABLET ORAL EVERY 6 HOURS PRN
Qty: 28 TABLET | Refills: 0 | Status: SHIPPED | OUTPATIENT
Start: 2020-09-23 | End: 2020-09-30

## 2020-09-24 ENCOUNTER — OFFICE VISIT (OUTPATIENT)
Dept: ORTHOPEDIC SURGERY | Age: 25
End: 2020-09-24

## 2020-09-24 VITALS — WEIGHT: 217 LBS | BODY MASS INDEX: 29.39 KG/M2 | HEIGHT: 72 IN

## 2020-09-24 PROCEDURE — 99024 POSTOP FOLLOW-UP VISIT: CPT | Performed by: ORTHOPAEDIC SURGERY

## 2020-09-28 NOTE — PROGRESS NOTES
Surgery: Arthroscopic labral repair    Post-Op Week:  2    Chief Complaint:  Post-Op Check (LEFT SHOULDER LABRAL REPAIR 9/10/2020)      History of Present of Illness:  FranciscoJ avier Weir is been doing reasonably well. He had quite a bit of pain the first few days after surgery more well-tolerated at this point. He notes no specific complications. Review of Systems  Pertinent items are noted in HPI  Denies fever, chills, confusion, bowel/bladder active change. Review of systems reviewed from Patient History Form dated on September 28 and available in the patient's chart under the Media tab. Examination:  On exam his portals are healing nicely. He has normal sensation axillary nerve. He has good active control the elbow wrist hand and biceps contouring. Tolerates pendulums well    Radiology: We reviewed his arthroscopic images    Orders Placed This Encounter   Procedures   [address] Physical Therapy     Referral Priority:   Routine     Referral Type:   Eval and Treat     Referral Reason:   Specialty Services Required     Requested Specialty:   Physical Therapy     Number of Visits Requested:   1       Impression:  No immediate complications      Treatment Plan: We have discussed appropriate use of the sling beginning to wean from it. Importance of good scapular posturing. Supportive soft tissue care. Initiating a physical therapy program based upon the labral rehabilitation protocol. Follow-up in 4 weeks. Off work. [facility] Surgery     This dictation was performed with a verbal recognition program (DRAGON) and it was checked for errors. It is possible that there are still dictated errors within this office note. If so, please bring any errors to my attention for an addendum. All efforts were made to ensure that this office note is accurate.

## 2020-09-29 ENCOUNTER — HOSPITAL ENCOUNTER (OUTPATIENT)
Dept: PHYSICAL THERAPY | Age: 25
Setting detail: THERAPIES SERIES
Discharge: HOME | End: 2020-09-29
Payer: COMMERCIAL

## 2020-09-29 PROCEDURE — 97110 THERAPEUTIC EXERCISES: CPT | Performed by: PHYSICAL THERAPIST

## 2020-09-29 PROCEDURE — 97140 MANUAL THERAPY 1/> REGIONS: CPT | Performed by: PHYSICAL THERAPIST

## 2020-09-29 PROCEDURE — 97016 VASOPNEUMATIC DEVICE THERAPY: CPT | Performed by: PHYSICAL THERAPIST

## 2020-09-29 PROCEDURE — 97161 PT EVAL LOW COMPLEX 20 MIN: CPT | Performed by: PHYSICAL THERAPIST

## 2020-09-29 NOTE — PLAN OF CARE
[facility], [address]  Phone: [phone]  Fax [phone]     Physical Therapy Certification    Dear Referring Practitioner: Dr. Montse Gallo,    We had the pleasure of evaluating the following patient for physical therapy services at [address] [place] [address]. A summary of our findings can be found in the initial assessment below. This includes our plan of care. If you have any questions or concerns regarding these findings, please do not hesitate to contact me at the office phone number checked above. Thank you for the referral.       Physician Signature:_______________________________Date:__________________  By signing above (or electronic signature), therapists plan is approved by physician    Patient: Gisselle Whiting   : 1995   MRN: [id number]  Referring Physician: Referring Practitioner: Dr. Montse Gallo      Evaluation Date: 2020      Medical Diagnosis Information:  Diagnosis: D96.952 (ICD-10-CM) - Shoulder instability, left , S07.232A (ICD-10-CM) - Anterior to posterior tear of superior glenoid labrum of left shoulder s/p L shld posterior labral repair on 20   Treatment Diagnosis: M25.312 (ICD-10-CM) - Shoulder instability, left , O03.092Y (ICD-10-CM) - Anterior to posterior tear of superior glenoid labrum of left shoulder s/p L shld posterior labral repair on 20                                         Insurance information: PT Insurance Information: [employer]/60 PT/ no auth    Precautions/ Contra-indications: Posterior labral repair protocol.   C-SSRS Triggered by Intake questionnaire (Past 2 wk assessment):   [x] No, Questionnaire did not trigger screening.   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 Falls Risk Assessment (30 days):   [x] Falls Risk assessed and no intervention required. [] Falls Risk assessed and Patient requires intervention due to being higher risk   TUG score (>12s at risk):     [] Falls education provided, including         ASSESSMENT:   Functional Impairments   []Noted spinal or UE joint hypomobility   []Noted spinal or UE joint hypermobility   [x]Decreased UE functional ROM   [x]Decreased UE functional strength   []Abnormal reflexes/sensation/myotomal/dermatomal deficits   [x]Decreased RC/scapular/core strength and neuromuscular control   []other:      Functional Activity Limitations (from functional questionnaire and intake)   []Reduced ability to tolerate prolonged functional positions   []Reduced ability or difficulty with changes of positions or transfers between positions   []Reduced ability to maintain good posture and demonstrate good body mechanics with sitting, bending, and lifting   [x] Reduced ability or tolerance with driving and/or computer work   [x]Reduced ability to sleep   [x]Reduced ability to perform lifting, reaching, carrying tasks   []Reduced ability to tolerate impact through UE   [x]Reduced ability to reach behind back   []Reduced ability to  or hold objects   []Reduced ability to throw or toss an object   []other:    Participation Restrictions   [x]Reduced participation in self care activities   [x]Reduced participation in home management activities   [x]Reduced participation in work activities   []Reduced participation in social activities. []Reduced participation in sport/recreation activities.     Classification:   [x]Signs/symptoms consistent with post-surgical status including minutes face-to-face)  [] RE-EVAL       PLAN:  Frequency/Duration:  1-2 days per week for 12 Weeks:  INTERVENTIONS:  [x] Therapeutic exercise including: strength training, ROM, for Upper extremity and core   [x]  NMR activation and proprioception for UE, scap and Core   [x] Manual therapy as indicated for shoulder, scapula and spine to include: Dry Needling/IASTM, STM, PROM, Gr I-IV mobilizations, manipulation. [x] Modalities as needed that may include: thermal agents, E-stim, Biofeedback, US, iontophoresis as indicated  [x] Patient education on joint protection, postural re-education, activity modification, progression of HEP. HEP instruction: (see scanned forms)  Access Code: [email or web address]   URL: [email or web address]   Date: 09/29/2020   Prepared by: Lilian Cervantes     Exercises   Circular Shoulder Pendulum with Table Support - 10 reps - 1-2 sets - 1-2x daily - 7x weekly   Seated Shoulder Flexion Towel Slide at Table Top - 10 reps - 1 sets - 10 hold - 2x daily - 7x weekly   Supported Elbow Flexion Extension PROM - 10 reps - 1 sets - 10 hold - 2x daily - 7x weekly   Putty Squeezes - 10 reps - 1 sets - 10 hold - 2x daily - 7x weekly   Seated Shoulder External Rotation AAROM with Cane and Hand in Neutral - 10 reps - 1 sets - 2x daily - 7x weekly   Seated Shoulder Shrugs - 10 reps - 1 sets - 1 - 2 hold - 2x daily - 7x weekly   Seated Scapular Retraction - 10 reps - 1 sets - 1-2 hold - 2x daily - 7x weekly     GOALS:  Patient stated goal: No pain  [] Progressing: [] Met: [] Not Met: [] Adjusted    Therapist goals for Patient:   Short Term Goals: To be achieved in: 2 weeks  1. Independent in HEP and progression per patient tolerance, in order to prevent re-injury. [] Progressing: [] Met: [] Not Met: [] Adjusted  2. Patient will have a decrease in pain to facilitate improvement in movement, function, and ADLs as indicated by Functional Deficits.   [] Progressing: [] Met: [] Not Met: [] Adjusted    Long Term Goals: To be achieved in: 12 weeks  1. Disability index score of 30% or less for the [facility] to assist with reaching prior level of function. [] Progressing: [] Met: [] Not Met: [] Adjusted  2. Patient will demonstrate increased AROM to WNL L shld to allow for proper joint functioning as indicated by patients Functional Deficits. [] Progressing: [] Met: [] Not Met: [] Adjusted  3. Patient will demonstrate an increase in Strength to 3+/5 or better to allow for proper functional mobility as indicated by patients Functional Deficits. [] Progressing: [] Met: [] Not Met: [] Adjusted  4. Patient will return to reaching overhead functional activities without increased symptoms or restriction. [] Progressing: [] Met: [] Not Met: [] Adjusted  5.  Reaching behind back without pain(patient specific functional goal)    [] Progressing: [] Met: [] Not Met: [] Adjusted       Electronically signed by:  Jarrod Hart, [address] [facility], [id number]

## 2020-09-29 NOTE — FLOWSHEET NOTE
[facility], [address]  Phone: [phone]  Fax [phone]        Date:  2020    Patient Name:  Jose Garsia    :  1995  MRN: [id number]  Restrictions/Precautions:  Posterior labral Protocol.  Sling  Medical/Treatment Diagnosis Information:  · Diagnosis: M25.312 (ICD-10-CM) - Shoulder instability, left , S43.432A (ICD-10-CM) - Anterior to posterior tear of superior glenoid labrum of left shoulder s/p L shld posterior labral repair on 20  · Treatment Diagnosis: M25.312 (ICD-10-CM) - Shoulder instability, left , X36.428H (ICD-10-CM) - Anterior to posterior tear of superior glenoid labrum of left shoulder s/p L shld posterior labral repair on 84  Insurance/Certification information:  PT Insurance Information: [employer]/60 PT/ no auth  Physician Information:  Referring Practitioner: Dr. Cordelia Delgado  Has the plan of care been signed (Y/N):        []  Yes  [x]  No     Date of Patient follow up with Physician: none scheduled      Is this a Progress Report:     []  Yes  [x]  No        If Yes:  Date Range for reporting period:  Beginnin20  Rizykp30/29/20    Progress report will be due (10 Rx or 30 days whichever is less):       Recertification will be due (POC Duration  / 90 days whichever is less): 12 weeks      Visit # Insurance Allowable Auth Required   In-person 1 60PT []  Yes [x]  No    Telehealth   []  Yes []  No    Total            Functional Scale: Quick dash: 73%    Date assessed:  20      Therapy Diagnosis/Practice Pattern:4I      Number of Comorbidities:  []0     []1-2    []3    Latex Allergy:  [x]NO      []YES  Preferred Language for Healthcare:   [x]English       []other:      Pain level:  7-10/10     SUBJECTIVE:  See eval    OBJECTIVE: See eval   Observation:    Test measurements:    OBJECTIVE  Test used Initial score Current Score   Pain Summary 0-10 7-10 Functional questionnaire Quick Dash 73%    Functional Testing            ROM PROM Flex 80     PROM Er 0    Strength                RESTRICTIONS/PRECAUTIONS:   Date of Procedure: 9/16/2020     Pre-Op Diagnosis: SHOULDER INSTABILITY, LEFT     Post-Op Diagnosis: Posterior labral tear, degenerative superior labral tear, low-grade articular sided fraying along the articular surface of the supraspinatus consistent with an i internal impingement syndrome       Procedure(s):  LEFT SHOULDER VIDEO ARTHROSCOPY WITH POSTERIOR LABRAL RECONSTRUCTION. LIMITED DEBRIDEMENT ROTATOR CUFF.     #1.  Left shoulder arthroscopy with posterior labral repair; 12321  #2.  Left shoulder arthroscopy with a limited debridement including the articular surface of the supraspinatus and superior labrum;       Exercises/Interventions:     Therapeutic Ex (43477) Sets/sec Reps Notes/CUES   Performed exercises and instructed in HEP per below 15'                                                                       Manual Intervention (66280)      Prom L shld /Grade 1-2 joint mobs 10'                                   NMR re-education (04117)   CUES NEEDED   Patient education on sling/ precautions/HEP/restrictions and surgery education 10'                                                     Therapeutic Activity (59706)                                          Access Code: [email or web address]   URL: [email or web address]   Date: 09/29/2020   Prepared by: Celeste Wadeiling     Exercises   Circular Shoulder Pendulum with Table Support - 10 reps - 1-2 sets - 1-2x daily - 7x weekly   Seated Shoulder Flexion Towel Slide at Table Top - 10 reps - 1 sets - 10 hold - 2x daily - 7x weekly   Supported Elbow Flexion Extension PROM - 10 reps - 1 sets - 10 hold - 2x daily - 7x weekly   Putty Squeezes - 10 reps - 1 sets - 10 hold - 2x daily - 7x weekly   Seated Shoulder External Rotation AAROM with Cane and Hand in Neutral - 10 reps - 1 sets - 2x daily - 7x weekly   Seated swelling/inflammation/restriction, improving soft tissue extensibility and allowing for proper ROM for normal function with self care, reaching, carrying, lifting, house/yardwork, driving/computer work    Modalities:  Vaso GR to L shld x15 min after treatment  Charges:  Timed Code Treatment Minutes: 25'   Total Treatment Minutes: 61'     [address] [facility] time in/time out:     [x] EVAL (LOW) 71735   [] EVAL (MOD) 24794   [] EVAL (HIGH) 33620   [] RE-EVAL     [x] GR(50321) x1     [] IONTO  [] NMR (15301) x     [x] VASO  [x] Manual (14496) x 1     [] Other:  [] TA x      [] Mech Traction (69522)  [] ES(attended) (78481)      [] ES (un) (30533):     GOALS: (cut and paste from eval)  Patient stated goal: No pain  []? Progressing: []? Met: []? Not Met: []? Adjusted     Therapist goals for Patient:   Short Term Goals: To be achieved in: 2 weeks  1. Independent in HEP and progression per patient tolerance, in order to prevent re-injury. []? Progressing: []? Met: []? Not Met: []? Adjusted  2. Patient will have a decrease in pain to facilitate improvement in movement, function, and ADLs as indicated by Functional Deficits. []? Progressing: []? Met: []? Not Met: []? Adjusted     Long Term Goals: To be achieved in: 12 weeks  1. Disability index score of 30% or less for the [facility] to assist with reaching prior level of function. []? Progressing: []? Met: []? Not Met: []? Adjusted  2. Patient will demonstrate increased AROM to WNL L shld to allow for proper joint functioning as indicated by patients Functional Deficits. []? Progressing: []? Met: []? Not Met: []? Adjusted  3. Patient will demonstrate an increase in Strength to 3+/5 or better to allow for proper functional mobility as indicated by patients Functional Deficits. []? Progressing: []? Met: []? Not Met: []? Adjusted  4. Patient will return to reaching overhead functional activities without increased symptoms or restriction. []? Progressing: []? Met: []?  Not Met: []? Adjusted  5. Reaching behind back without pain(patient specific functional goal)    []? Progressing: []? Met: []? Not Met: []? Adjusted       Progression Towards Functional goals:  [] Patient is progressing as expected towards functional goals listed. [] Progression is slowed due to complexities listed. [] Progression has been slowed due to co-morbidities. [x] Plan just implemented, too soon to assess goals progression  [] Other:     ASSESSMENT:  See eval.      Overall Progression Towards Functional goals/ Treatment Progress Update:  [] Patient is progressing as expected towards functional goals listed. [] Progression is slowed due to complexities/Impairments listed. [] Progression has been slowed due to co-morbidities. [x] Plan just implemented, too soon to assess goals progression <30days   [] Goals require adjustment due to lack of progress  [] Patient is not progressing as expected and requires additional follow up with physician  [] Other    Prognosis for POC: [x] Good [] Fair  [] Poor      Patient requires continued skilled intervention: [x] Yes  [] No    Treatment/Activity Tolerance:  [x] Patient able to complete treatment  [] Patient limited by fatigue  [] Patient limited by pain    [] Patient limited by other medical complications  [] Other:         Return to Play: (if applicable)   []  Stage 1: Intro to Strength   []  Stage 2: Return to Run and Strength   []  Stage 3: Return to Jump and Strength   []  Stage 4: Dynamic Strength and Agility   []  Stage 5: Sport Specific Training     []  Ready to Return to Play, Meets All Above Stages   []  Not Ready for Return to Sports   Comments:                               PLAN: See eval. PROM per protocol for Posterior Labral repair. 1-2x/week.   [] Continue per plan of care [] Alter current plan (see comments above)  [x] Plan of care initiated [] Hold pending MD visit [] Discharge      Electronically signed by:  Maxwell Garcia, [id number]    Note: If patient does not return for scheduled/ recommended follow up visits, this note will serve as a discharge from care along with most recent update on progress.

## 2020-10-01 ENCOUNTER — TELEPHONE (OUTPATIENT)
Dept: ORTHOPEDIC SURGERY | Age: 25
End: 2020-10-01

## 2020-10-02 RX ORDER — OXYCODONE HYDROCHLORIDE AND ACETAMINOPHEN 5; 325 MG/1; MG/1
1 TABLET ORAL EVERY 6 HOURS PRN
Qty: 18 TABLET | Refills: 0 | Status: SHIPPED | OUTPATIENT
Start: 2020-10-02 | End: 2020-10-07 | Stop reason: SDUPTHER

## 2020-10-05 ENCOUNTER — HOSPITAL ENCOUNTER (OUTPATIENT)
Dept: PHYSICAL THERAPY | Age: 25
Setting detail: THERAPIES SERIES
Discharge: HOME | End: 2020-10-05
Payer: COMMERCIAL

## 2020-10-05 PROCEDURE — 97110 THERAPEUTIC EXERCISES: CPT | Performed by: PHYSICAL THERAPIST

## 2020-10-05 PROCEDURE — 97140 MANUAL THERAPY 1/> REGIONS: CPT | Performed by: PHYSICAL THERAPIST

## 2020-10-05 PROCEDURE — 97016 VASOPNEUMATIC DEVICE THERAPY: CPT | Performed by: PHYSICAL THERAPIST

## 2020-10-05 NOTE — FLOWSHEET NOTE
[facility], [address] [place]  Phone: [phone]  Fax [phone]        Date:  10/5/2020    Patient Name:  Eliecer Black    :  1995  MRN: [id number]  Restrictions/Precautions:  Posterior labral Protocol. Sling  Medical/Treatment Diagnosis Information:  · Diagnosis: M25.312 (ICD-10-CM) - Shoulder instability, left , S43.432A (ICD-10-CM) - Anterior to posterior tear of superior glenoid labrum of left shoulder s/p L shld posterior labral repair on 20  · Treatment Diagnosis: M25.312 (ICD-10-CM) - Shoulder instability, left , A29.484J (ICD-10-CM) - Anterior to posterior tear of superior glenoid labrum of left shoulder s/p L shld posterior labral repair on   Insurance/Certification information:  PT Insurance Information: [employer]/60 PT/ no auth  Physician Information:  Referring Practitioner: Dr. Rufus Oliveros  Has the plan of care been signed (Y/N):        []  Yes  [x]  No     Date of Patient follow up with Physician: none scheduled      Is this a Progress Report:     []  Yes  [x]  No        If Yes:  Date Range for reporting period:  Beginnin20  Yahdgg65/29/20    Progress report will be due (10 Rx or 30 days whichever is less):       Recertification will be due (POC Duration  / 90 days whichever is less): 12 weeks      Visit # Insurance Allowable Auth Required   In-person 2 60PT []  Yes [x]  No    Telehealth   []  Yes []  No    Total            Functional Scale: Quick dash: 73%    Date assessed:  20      Therapy Diagnosis/Practice Pattern:4I      Number of Comorbidities:  []0     []1-2    []3    Latex Allergy:  [x]NO      []YES  Preferred Language for Healthcare:   [x]English       []other:      Pain level:  6-7/10     SUBJECTIVE:  Reports no new issues from last session in PT. Reports  Turning over while sleeping and turned onto L shld. Had a lot of pain.  Soreness in general still noted upon arrival. Wearing bumper for sling except coming into to PT today. Sees MD next week. OBJECTIVE: table slide flexion exercise: ~100-105.  Observation:     Test measurements:  3 weeks post-op 10/7/20  OBJECTIVE  Test used Initial score Current Score   Pain Summary 0-10 7-10 6-7   Functional questionnaire Quick Dash 73%    Functional Testing            ROM PROM Flex 80 105    PROM Er 0 ~20-25   Strength                RESTRICTIONS/PRECAUTIONS:   Date of Procedure: 9/16/2020     Pre-Op Diagnosis: SHOULDER INSTABILITY, LEFT     Post-Op Diagnosis: Posterior labral tear, degenerative superior labral tear, low-grade articular sided fraying along the articular surface of the supraspinatus consistent with an i internal impingement syndrome       Procedure(s):  LEFT SHOULDER VIDEO ARTHROSCOPY WITH POSTERIOR LABRAL RECONSTRUCTION. LIMITED DEBRIDEMENT ROTATOR CUFF.     #1.  Left shoulder arthroscopy with posterior labral repair; 46092  #2.  Left shoulder arthroscopy with a limited debridement including the articular surface of the supraspinatus and superior labrum;       Exercises/Interventions:     Therapeutic Ex (58164) Sets/sec Reps Notes/CUES       Shrugs/scap retraction  10 reps    Elbow/wrist AROM  10 reps    Table slides H10 10 reps    ER PROM in sitting H10 10 reps    pendulum 3'                                         Manual Intervention (73097)      Prom L shld /Grade 1-2 joint mobs 10'  Distraction helped alleviate catching in shoulder at `90 degrees                                 NMR re-education (46689)   CUES NEEDED   Patient education on sling/ precautions/HEP/restrictions and surgery education 10'                                                     Therapeutic Activity (80944)                                          Access Code: [email or web address]   URL: [email or web address]   Date: 09/29/2020   Prepared by: Brannon Jerry     Exercises   Circular Shoulder Pendulum with Table Support - 10 reps - 1-2 []? Progressing: []? Met: []? Not Met: []? Adjusted  3. Patient will demonstrate an increase in Strength to 3+/5 or better to allow for proper functional mobility as indicated by patients Functional Deficits. []? Progressing: []? Met: []? Not Met: []? Adjusted  4. Patient will return to reaching overhead functional activities without increased symptoms or restriction. []? Progressing: []? Met: []? Not Met: []? Adjusted  5. Reaching behind back without pain(patient specific functional goal)    []? Progressing: []? Met: []? Not Met: []? Adjusted       Progression Towards Functional goals:  [] Patient is progressing as expected towards functional goals listed. [] Progression is slowed due to complexities listed. [] Progression has been slowed due to co-morbidities. [x] Plan just implemented, too soon to assess goals progression  [] Other:     ASSESSMENT:  See johnny.      Overall Progression Towards Functional goals/ Treatment Progress Update:  [] Patient is progressing as expected towards functional goals listed. [] Progression is slowed due to complexities/Impairments listed. [] Progression has been slowed due to co-morbidities.   [x] Plan just implemented, too soon to assess goals progression <30days   [] Goals require adjustment due to lack of progress  [] Patient is not progressing as expected and requires additional follow up with physician  [] Other    Prognosis for POC: [x] Good [] Fair  [] Poor      Patient requires continued skilled intervention: [x] Yes  [] No    Treatment/Activity Tolerance:  [x] Patient able to complete treatment  [] Patient limited by fatigue  [] Patient limited by pain    [] Patient limited by other medical complications  [] Other:         Return to Play: (if applicable)   []  Stage 1: Intro to Strength   []  Stage 2: Return to Run and Strength   []  Stage 3: Return to Jump and Strength   []  Stage 4: Dynamic Strength and Agility   []  Stage 5: Sport Specific Training     [] Ready to Return to Play, Meets All Above Stages   []  Not Ready for Return to Sports   Comments:                               PLAN: See eval. PROM per protocol for Posterior Labral repair. 1-2x/week. [x] Continue per plan of care [] Alter current plan (see comments above)  [] Plan of care initiated [] Hold pending MD visit [] Discharge      Electronically signed by:  [place], [id number]    Note: If patient does not return for scheduled/ recommended follow up visits, this note will serve as a discharge from care along with most recent update on progress.

## 2020-10-07 RX ORDER — OXYCODONE HYDROCHLORIDE AND ACETAMINOPHEN 5; 325 MG/1; MG/1
1 TABLET ORAL EVERY 6 HOURS PRN
Qty: 18 TABLET | Refills: 0 | Status: SHIPPED | OUTPATIENT
Start: 2020-10-07 | End: 2020-10-13 | Stop reason: SDUPTHER

## 2020-10-08 ENCOUNTER — HOSPITAL ENCOUNTER (OUTPATIENT)
Dept: PHYSICAL THERAPY | Age: 25
Setting detail: THERAPIES SERIES
Discharge: HOME | End: 2020-10-08
Payer: COMMERCIAL

## 2020-10-08 PROCEDURE — 97110 THERAPEUTIC EXERCISES: CPT | Performed by: PHYSICAL THERAPIST

## 2020-10-08 PROCEDURE — 97140 MANUAL THERAPY 1/> REGIONS: CPT | Performed by: PHYSICAL THERAPIST

## 2020-10-08 PROCEDURE — 97016 VASOPNEUMATIC DEVICE THERAPY: CPT | Performed by: PHYSICAL THERAPIST

## 2020-10-08 PROCEDURE — 97112 NEUROMUSCULAR REEDUCATION: CPT | Performed by: PHYSICAL THERAPIST

## 2020-10-08 NOTE — FLOWSHEET NOTE
[facility], [address]  Phone: [phone]  Fax [phone]        Date:  10/8/2020    Patient Name:  Octavia Spence    :  1995  MRN: [id number]  Restrictions/Precautions:  Posterior labral Protocol. Sling  Medical/Treatment Diagnosis Information:  · Diagnosis: M25.312 (ICD-10-CM) - Shoulder instability, left , S43.432A (ICD-10-CM) - Anterior to posterior tear of superior glenoid labrum of left shoulder s/p L shld posterior labral repair on 20  · Treatment Diagnosis: M25.312 (ICD-10-CM) - Shoulder instability, left , D28.589O (ICD-10-CM) - Anterior to posterior tear of superior glenoid labrum of left shoulder s/p L shld posterior labral repair on   Insurance/Certification information:  PT Insurance Information: [employer]/60 PT/ no auth  Physician Information:  Referring Practitioner: Dr. Rufino Vora  Has the plan of care been signed (Y/N):        []  Yes  [x]  No     Date of Patient follow up with Physician: 10-      Is this a Progress Report:     []  Yes  [x]  No        If Yes:  Date Range for reporting period:  Beginnin20  Orgftm77/29/20    Progress report will be due (10 Rx or 30 days whichever is less): 15/57/52      Recertification will be due (POC Duration  / 90 days whichever is less): 12 weeks      Visit # Insurance Allowable Auth Required   In-person 3 60PT []  Yes [x]  No    Telehealth   []  Yes []  No    Total            Functional Scale: Quick dash: 73%    Date assessed:  20      Therapy Diagnosis/Practice Pattern:4I      Number of Comorbidities:  []0     []1-2    []3    Latex Allergy:  [x]NO      []YES  Preferred Language for Healthcare:   [x]English       []other:      Pain level:  6-7/10     SUBJECTIVE:  Reports shoulder is feeling good when at rest in the sling. He has felt better since not lying on it. He does feel it when he does quick movements. OBJECTIVE:    Observation:     Test measurements:  3 weeks post-op 10/7/20  OBJECTIVE  Test used Initial score Current Score   Pain Summary 0-10 7-10 6-7   Functional questionnaire Quick Dash 73%    Functional Testing            ROM PROM Flex 80 105    PROM Er 0 ~20-25   Strength                RESTRICTIONS/PRECAUTIONS:   Date of Procedure: 9/16/2020     Pre-Op Diagnosis: SHOULDER INSTABILITY, LEFT     Post-Op Diagnosis: Posterior labral tear, degenerative superior labral tear, low-grade articular sided fraying along the articular surface of the supraspinatus consistent with an i internal impingement syndrome       Procedure(s):  LEFT SHOULDER VIDEO ARTHROSCOPY WITH POSTERIOR LABRAL RECONSTRUCTION. LIMITED DEBRIDEMENT ROTATOR CUFF.     #1.  Left shoulder arthroscopy with posterior labral repair; 32716  #2.  Left shoulder arthroscopy with a limited debridement including the articular surface of the supraspinatus and superior labrum;       Exercises/Interventions:     Therapeutic Ex (48170) Sets/sec Reps Notes/CUES       Shrugs/scap retraction  10 reps    Elbow/wrist AROM  10 reps    Table slides H10 10 reps    ER PROM in sitting H10 10 reps    pendulum 3'           T spine sling matrix 3D 8 reps ea                            Manual Intervention (54752)      Prom L shld /Grade 1-2 joint mobs, CF to biceps tendon 10'  Distraction helped alleviate catching in shoulder at `90 degrees                                 NMR re-education (47502)   CUES NEEDED   Patient education on sling/ precautions/HEP/restrictions and surgery education 10'           Scap clocks 10 reps                                         Therapeutic Activity (36705)                                          Access Code: [email or web address]   URL: [email or web address]   Date: 09/29/2020   Prepared by: Alfa Buchanan     Exercises   Circular Shoulder Pendulum with Table Support - 10 reps - 1-2 sets - 1-2x daily - 7x weekly   Seated Shoulder Flexion Towel Slide at Table Top - 10 reps - 1 sets - 10 hold - 2x daily - 7x weekly   Supported Elbow Flexion Extension PROM - 10 reps - 1 sets - 10 hold - 2x daily - 7x weekly   Putty Squeezes - 10 reps - 1 sets - 10 hold - 2x daily - 7x weekly   Seated Shoulder External Rotation AAROM with Cane and Hand in Neutral - 10 reps - 1 sets - 2x daily - 7x weekly   Seated Shoulder Shrugs - 10 reps - 1 sets - 1 - 2 hold - 2x daily - 7x weekly   Seated Scapular Retraction - 10 reps - 1 sets - 1-2 hold - 2x daily - 7x weekly         Therapeutic Exercise and NMR EXR  [x] (44034) Provided verbal/tactile cueing for activities related to strengthening, flexibility, endurance, ROM  for improvements in scapular, scapulothoracic and UE control with self care, reaching, carrying, lifting, house/yardwork, driving/computer work.    [] (49430) Provided verbal/tactile cueing for activities related to improving balance, coordination, kinesthetic sense, posture, motor skill, proprioception  to assist with  scapular, scapulothoracic and UE control with self care, reaching, carrying, lifting, house/yardwork, driving/computer work. Therapeutic Activities:    [] (19297 or 96199) Provided verbal/tactile cueing for activities related to improving balance, coordination, kinesthetic sense, posture, motor skill, proprioception and motor activation to allow for proper function of scapular, scapulothoracic and UE control with self care, carrying, lifting, driving/computer work.      Home Exercise Program:    [x] (50570) Reviewed/Progressed HEP activities related to strengthening, flexibility, endurance, ROM of scapular, scapulothoracic and UE control with self care, reaching, carrying, lifting, house/yardwork, driving/computer work  [] (28876) Reviewed/Progressed HEP activities related to improving balance, coordination, kinesthetic sense, posture, motor skill, proprioception of scapular, scapulothoracic and UE control with self care, reaching, carrying, lifting, house/yardwork, driving/computer work      Manual Treatments:  PROM / STM / Oscillations-Mobs:  G-I, II, III, IV (PA's, Inf., Post.)  [x] (51087) Provided manual therapy to mobilize soft tissue/joints of cervical/CT, scapular GHJ and UE for the purpose of modulating pain, promoting relaxation,  increasing ROM, reducing/eliminating soft tissue swelling/inflammation/restriction, improving soft tissue extensibility and allowing for proper ROM for normal function with self care, reaching, carrying, lifting, house/yardwork, driving/computer work    Modalities:  Vaso GR to L shld x15 min after treatment  Charges:  Timed Code Treatment Minutes: 45'   Total Treatment Minutes: 48'     [facility] time in/time out:     [] EVAL (LOW) 12304   [] EVAL (MOD) 05264   [] EVAL (HIGH) 78302   [] RE-EVAL     [x] VI(71011) x1     [] IONTO  [x] NMR (10314) x  1   [x] VASO  [x] Manual (45324) x 1     [] Other:  [] TA x      [] Mech Traction (29577)  [] ES(attended) (21213)      [] ES (un) (64002):     GOALS:   Patient stated goal: No pain  []? Progressing: []? Met: []? Not Met: []? Adjusted     Therapist goals for Patient:   Short Term Goals: To be achieved in: 2 weeks  1. Independent in HEP and progression per patient tolerance, in order to prevent re-injury. []? Progressing: []? Met: []? Not Met: []? Adjusted  2. Patient will have a decrease in pain to facilitate improvement in movement, function, and ADLs as indicated by Functional Deficits. []? Progressing: []? Met: []? Not Met: []? Adjusted     Long Term Goals: To be achieved in: 12 weeks  1. Disability index score of 30% or less for the [facility] to assist with reaching prior level of function. []? Progressing: []? Met: []? Not Met: []? Adjusted  2. Patient will demonstrate increased AROM to WNL L shld to allow for proper joint functioning as indicated by patients Functional Deficits. []? Progressing: []? Met: []? Not Met: []? Adjusted  3.  Patient will demonstrate an increase in Strength to 3+/5 or better to allow for proper functional mobility as indicated by patients Functional Deficits. []? Progressing: []? Met: []? Not Met: []? Adjusted  4. Patient will return to reaching overhead functional activities without increased symptoms or restriction. []? Progressing: []? Met: []? Not Met: []? Adjusted  5. Reaching behind back without pain(patient specific functional goal)    []? Progressing: []? Met: []? Not Met: []? Adjusted       Progression Towards Functional goals:  [x] Patient is progressing as expected towards functional goals listed. [] Progression is slowed due to complexities listed. [] Progression has been slowed due to co-morbidities. [x] Plan just implemented, too soon to assess goals progression  [] Other:     ASSESSMENT:  Slowly improving PROM. Overall Progression Towards Functional goals/ Treatment Progress Update:  [x] Patient is progressing as expected towards functional goals listed. [] Progression is slowed due to complexities/Impairments listed. [] Progression has been slowed due to co-morbidities.   [] Plan just implemented, too soon to assess goals progression <30days   [] Goals require adjustment due to lack of progress  [] Patient is not progressing as expected and requires additional follow up with physician  [] Other    Prognosis for POC: [x] Good [] Fair  [] Poor      Patient requires continued skilled intervention: [x] Yes  [] No    Treatment/Activity Tolerance:  [x] Patient able to complete treatment  [] Patient limited by fatigue  [] Patient limited by pain    [] Patient limited by other medical complications  [] Other:         Return to Play: (if applicable)   []  Stage 1: Intro to Strength   []  Stage 2: Return to Run and Strength   []  Stage 3: Return to Jump and Strength   []  Stage 4: Dynamic Strength and Agility   []  Stage 5: Sport Specific Training     []  Ready to Return to Play, Meets All Above Stages   []  Not Ready for Return to Sports   Comments:                               PLAN: See eval. PROM per protocol for Posterior Labral repair. 1-2x/week. [x] Continue per plan of care [] Alter current plan (see comments above)  [] Plan of care initiated [] Hold pending MD visit [] Discharge      Electronically signed by:  Omero Cristobal, PT, [id number]    Note: If patient does not return for scheduled/ recommended follow up visits, this note will serve as a discharge from care along with most recent update on progress.

## 2020-10-12 ENCOUNTER — HOSPITAL ENCOUNTER (OUTPATIENT)
Dept: PHYSICAL THERAPY | Age: 25
Setting detail: THERAPIES SERIES
Discharge: HOME | End: 2020-10-12
Payer: COMMERCIAL

## 2020-10-13 RX ORDER — OXYCODONE HYDROCHLORIDE AND ACETAMINOPHEN 5; 325 MG/1; MG/1
1 TABLET ORAL EVERY 6 HOURS PRN
Qty: 18 TABLET | Refills: 0 | Status: SHIPPED | OUTPATIENT
Start: 2020-10-13 | End: 2020-10-19 | Stop reason: SDUPTHER

## 2020-10-13 NOTE — TELEPHONE ENCOUNTER
Last office visit 9/24/2020     Last written 10/7/2020     Surgery 9/16/20     Next office visit scheduled 10/20/2020    Requested Prescriptions     Pending Prescriptions Disp Refills    oxyCODONE-acetaminophen (PERCOCET) 5-325 MG per tablet 18 tablet 0     Sig: Take 1 tablet by mouth every 6 hours as needed for Pain for up to 5 days.

## 2020-10-15 ENCOUNTER — HOSPITAL ENCOUNTER (OUTPATIENT)
Dept: PHYSICAL THERAPY | Age: 25
Setting detail: THERAPIES SERIES
Discharge: HOME | End: 2020-10-15
Payer: COMMERCIAL

## 2020-10-19 ENCOUNTER — HOSPITAL ENCOUNTER (OUTPATIENT)
Dept: PHYSICAL THERAPY | Age: 25
Setting detail: THERAPIES SERIES
Discharge: HOME | End: 2020-10-19
Payer: COMMERCIAL

## 2020-10-19 RX ORDER — OXYCODONE HYDROCHLORIDE AND ACETAMINOPHEN 5; 325 MG/1; MG/1
1 TABLET ORAL EVERY 6 HOURS PRN
Qty: 18 TABLET | Refills: 0 | Status: SHIPPED | OUTPATIENT
Start: 2020-10-19 | End: 2020-10-20 | Stop reason: CLARIF

## 2020-10-19 NOTE — TELEPHONE ENCOUNTER
Last office visit 9/24/2020    Last written 10/13/20     Surgery 9/16/20 (4 sweeks 5 days)    Next office visit scheduled 10/20/2020    Requested Prescriptions     Pending Prescriptions Disp Refills    oxyCODONE-acetaminophen (PERCOCET) 5-325 MG per tablet 18 tablet 0     Sig: Take 1 tablet by mouth every 6 hours as needed for Pain for up to 5 days.

## 2020-10-19 NOTE — FLOWSHEET NOTE
[facility], [address], [address] [place] [address]        Physical Therapy  Cancellation/No-show Note  Patient Name:  Leighton Rankin  :  1995   Date:  10/19/2020  Cancelled visits to date:   No-shows to date: 3    For today's appointment patient:  ?  Cancelled  ? Rescheduled appointment  ? No-show     Reason given by patient:  ?  Patient ill  ? Conflicting appointment  ? No transportation    ? Conflict with work  ? No reason given  ?   Other:     Comments:      Electronically signed by:  Ricardo Shafer, [facility]

## 2020-10-20 ENCOUNTER — TELEPHONE (OUTPATIENT)
Dept: ORTHOPEDIC SURGERY | Age: 25
End: 2020-10-20

## 2020-10-22 ENCOUNTER — HOSPITAL ENCOUNTER (OUTPATIENT)
Dept: PHYSICAL THERAPY | Age: 25
Setting detail: THERAPIES SERIES
Discharge: HOME | End: 2020-10-22
Payer: COMMERCIAL

## 2020-10-22 PROCEDURE — 97016 VASOPNEUMATIC DEVICE THERAPY: CPT | Performed by: PHYSICAL THERAPIST

## 2020-10-22 PROCEDURE — 97112 NEUROMUSCULAR REEDUCATION: CPT | Performed by: PHYSICAL THERAPIST

## 2020-10-22 PROCEDURE — 97140 MANUAL THERAPY 1/> REGIONS: CPT | Performed by: PHYSICAL THERAPIST

## 2020-10-22 PROCEDURE — 97110 THERAPEUTIC EXERCISES: CPT | Performed by: PHYSICAL THERAPIST

## 2020-10-22 NOTE — FLOWSHEET NOTE
[facility], [address]  Phone: [phone]  Fax [phone]        Date:  10/22/2020    Patient Name:  Ty Jose    :  1995  MRN: [id number]  Restrictions/Precautions:  Posterior labral Protocol. Sling  Medical/Treatment Diagnosis Information:  · Diagnosis: M25.312 (ICD-10-CM) - Shoulder instability, left , S43.432A (ICD-10-CM) - Anterior to posterior tear of superior glenoid labrum of left shoulder s/p L shld posterior labral repair on 20  · Treatment Diagnosis: M25.312 (ICD-10-CM) - Shoulder instability, left , E66.279X (ICD-10-CM) - Anterior to posterior tear of superior glenoid labrum of left shoulder s/p L shld posterior labral repair on   Insurance/Certification information:  PT Insurance Information: Aet[employer]/60 PT/ no auth  Physician Information:  Referring Practitioner: Dr. Nga Smith  Has the plan of care been signed (Y/N):        []  Yes  [x]  No     Date of Patient follow up with Physician: 11-3-2020      Is this a Progress Report:     []  Yes  [x]  No        If Yes:  Date Range for reporting period:  Beginnin20  Ipnjsb23/29/20    Progress report will be due (10 Rx or 30 days whichever is less):       Recertification will be due (POC Duration  / 90 days whichever is less): 12 weeks      Visit # Insurance Allowable Auth Required   In-person 4 60PT []  Yes [x]  No    Telehealth   []  Yes []  No    Total            Functional Scale: Quick dash: 73%    Date assessed:  20      Therapy Diagnosis/Practice Pattern:4I      Number of Comorbidities:  []0     []1-2    []3    Latex Allergy:  [x]NO      []YES  Preferred Language for Healthcare:   [x]English       []other:      Pain level:  /10     SUBJECTIVE:  Reports missing therapy d/t father's CA diagnosis. Rescheduled with MD for next week.  Denies any problems with the shoulder while away, but says that he has been \"using his arm a lot\". OBJECTIVE:    Observation:     Test measurements:  3 weeks post-op 10/7/20  OBJECTIVE  Test used Initial score Current Score   Pain Summary 0-10 7-10 0-3   Functional questionnaire Quick Dash 73%    Functional Testing            ROM PROM Flex 80 145-148    PROM abd  130    PROM Er 0 58   Strength                RESTRICTIONS/PRECAUTIONS:   Date of Procedure: 9/16/2020     Pre-Op Diagnosis: SHOULDER INSTABILITY, LEFT     Post-Op Diagnosis: Posterior labral tear, degenerative superior labral tear, low-grade articular sided fraying along the articular surface of the supraspinatus consistent with an i internal impingement syndrome       Procedure(s):  LEFT SHOULDER VIDEO ARTHROSCOPY WITH POSTERIOR LABRAL RECONSTRUCTION. LIMITED DEBRIDEMENT ROTATOR CUFF.     #1.  Left shoulder arthroscopy with posterior labral repair; 23758  #2.  Left shoulder arthroscopy with a limited debridement including the articular surface of the supraspinatus and superior labrum;       Exercises/Interventions:     Therapeutic Ex (32466) Sets/sec Reps Notes/CUES   Pulleys H5 15 reps    Shrugs/scap retraction  10 reps          Table slides  SB flex/ scaption H10 10 reps  10 ea   LXX, XUT    ER PROM in sitting H10 10 reps    pendulum 3'     Cane flexion 10\" 10 reps    T spine sling matrix 3D 8 reps ea          Prone ext  Prone row BW 10 reps ea                Manual Intervention (10044)      Prom L shld /Grade 1-2 joint mobs, CF to biceps tendon 10'                                   NMR re-education (77214)   CUES NEEDED   Patient education on sling/ precautions/HEP/restrictions and surgery education 10'           Scap clocks 10 reps                                         Therapeutic Activity (92936)                                          Access Code: [email or web address]   URL: [email or web address]   Date: 09/29/2020   Prepared by: Isaias Cane     Exercises   Circular Shoulder Pendulum with Table Support - 10 reps - 1-2 sets - 1-2x daily - 7x weekly   Seated Shoulder Flexion Towel Slide at Table Top - 10 reps - 1 sets - 10 hold - 2x daily - 7x weekly   Supported Elbow Flexion Extension PROM - 10 reps - 1 sets - 10 hold - 2x daily - 7x weekly   Putty Squeezes - 10 reps - 1 sets - 10 hold - 2x daily - 7x weekly   Seated Shoulder External Rotation AAROM with Cane and Hand in Neutral - 10 reps - 1 sets - 2x daily - 7x weekly   Seated Shoulder Shrugs - 10 reps - 1 sets - 1 - 2 hold - 2x daily - 7x weekly   Seated Scapular Retraction - 10 reps - 1 sets - 1-2 hold - 2x daily - 7x weekly         Therapeutic Exercise and NMR EXR  [x] (97159) Provided verbal/tactile cueing for activities related to strengthening, flexibility, endurance, ROM  for improvements in scapular, scapulothoracic and UE control with self care, reaching, carrying, lifting, house/yardwork, driving/computer work.    [] (27737) Provided verbal/tactile cueing for activities related to improving balance, coordination, kinesthetic sense, posture, motor skill, proprioception  to assist with  scapular, scapulothoracic and UE control with self care, reaching, carrying, lifting, house/yardwork, driving/computer work. Therapeutic Activities:    [] (35882 or 75325) Provided verbal/tactile cueing for activities related to improving balance, coordination, kinesthetic sense, posture, motor skill, proprioception and motor activation to allow for proper function of scapular, scapulothoracic and UE control with self care, carrying, lifting, driving/computer work.      Home Exercise Program:    [x] (89811) Reviewed/Progressed HEP activities related to strengthening, flexibility, endurance, ROM of scapular, scapulothoracic and UE control with self care, reaching, carrying, lifting, house/yardwork, driving/computer work  [] (03066) Reviewed/Progressed HEP activities related to improving balance, coordination, kinesthetic sense, posture, motor skill, proprioception of scapular, []? Not Met: []? Adjusted  3. Patient will demonstrate an increase in Strength to 3+/5 or better to allow for proper functional mobility as indicated by patients Functional Deficits. []? Progressing: []? Met: []? Not Met: []? Adjusted  4. Patient will return to reaching overhead functional activities without increased symptoms or restriction. []? Progressing: []? Met: []? Not Met: []? Adjusted  5. Reaching behind back without pain(patient specific functional goal)    []? Progressing: []? Met: []? Not Met: []? Adjusted       Progression Towards Functional goals:  [x] Patient is progressing as expected towards functional goals listed. [] Progression is slowed due to complexities listed. [] Progression has been slowed due to co-morbidities. [x] Plan just implemented, too soon to assess goals progression  [] Other:     ASSESSMENT:  Improving ROM- pt requires continued skilled intervention to restore ROM, strength, improve proprioception and functional endurance. Overall Progression Towards Functional goals/ Treatment Progress Update:  [x] Patient is progressing as expected towards functional goals listed. [] Progression is slowed due to complexities/Impairments listed. [] Progression has been slowed due to co-morbidities.   [] Plan just implemented, too soon to assess goals progression <30days   [] Goals require adjustment due to lack of progress  [] Patient is not progressing as expected and requires additional follow up with physician  [] Other    Prognosis for POC: [x] Good [] Fair  [] Poor      Patient requires continued skilled intervention: [x] Yes  [] No    Treatment/Activity Tolerance:  [x] Patient able to complete treatment  [] Patient limited by fatigue  [] Patient limited by pain    [] Patient limited by other medical complications  [] Other:         Return to Play: (if applicable)   [x]  Stage 1: Intro to Strength   []  Stage 2: Return to Run and Strength   []  Stage 3: Return to Jump and Strength   []  Stage 4: Dynamic Strength and Agility   []  Stage 5: Sport Specific Training     []  Ready to Return to Play, Meets All Above Stages   []  Not Ready for Return to Sports   Comments:                               PLAN: See eval. PROM per protocol for Posterior Labral repair. 1-2x/week. [x] Continue per plan of care [] Alter current plan (see comments above)  [] Plan of care initiated [] Hold pending MD visit [] Discharge      Electronically signed by:  Barbra Moe, PT, [id number]    Note: If patient does not return for scheduled/ recommended follow up visits, this note will serve as a discharge from care along with most recent update on progress.

## 2020-10-26 ENCOUNTER — HOSPITAL ENCOUNTER (OUTPATIENT)
Dept: PHYSICAL THERAPY | Age: 25
Setting detail: THERAPIES SERIES
Discharge: HOME | End: 2020-10-26
Payer: COMMERCIAL

## 2020-10-26 PROCEDURE — 97110 THERAPEUTIC EXERCISES: CPT | Performed by: PHYSICAL THERAPIST

## 2020-10-26 PROCEDURE — 97112 NEUROMUSCULAR REEDUCATION: CPT | Performed by: PHYSICAL THERAPIST

## 2020-10-26 PROCEDURE — 97140 MANUAL THERAPY 1/> REGIONS: CPT | Performed by: PHYSICAL THERAPIST

## 2020-10-26 PROCEDURE — 97016 VASOPNEUMATIC DEVICE THERAPY: CPT | Performed by: PHYSICAL THERAPIST

## 2020-10-26 NOTE — FLOWSHEET NOTE
OBJECTIVE:    Observation:     Test measurements:  3 weeks post-op 10/7/20  OBJECTIVE  Test used Initial score 10/26/20   Pain Summary 0-10 7-10 0-3   Functional questionnaire Quick Dash 73% 36%   Functional Testing            ROM PROM Flex 80 150    PROM scap  155-160    PROM Er 0 78   Strength AROM flex  135-140    AROM abd  75 deg       RESTRICTIONS/PRECAUTIONS:   Date of Procedure: 9/16/2020     Pre-Op Diagnosis: SHOULDER INSTABILITY, LEFT     Post-Op Diagnosis: Posterior labral tear, degenerative superior labral tear, low-grade articular sided fraying along the articular surface of the supraspinatus consistent with an i internal impingement syndrome       Procedure(s):  LEFT SHOULDER VIDEO ARTHROSCOPY WITH POSTERIOR LABRAL RECONSTRUCTION. LIMITED DEBRIDEMENT ROTATOR CUFF.     #1.  Left shoulder arthroscopy with posterior labral repair; 19832  #2.  Left shoulder arthroscopy with a limited debridement including the articular surface of the supraspinatus and superior labrum;       Exercises/Interventions:     Therapeutic Ex (80714) Sets/sec Reps Notes/CUES   Pulleys H5 15 reps    Shrugs/scap retraction  10 reps    Wall slides flexion H10 10 reps    Table slides  SB flex/ scaption   LXX, RXX    ER PROM in sitting H10 10 reps    pendulum 3'     Cane flexion 10\" 10 reps    T spine sling matrix          Prone ext  Prone row                Manual Intervention (49714)      Prom L shld /Grade 1-2 joint mobs, CF to biceps tendon 10'                                   NMR re-education (84473)   CUES NEEDED   Patient education on sling/ precautions/HEP/restrictions and surgery education 10'           Scap clocks 10 reps                                         Therapeutic Activity (79943)                                          Access Code: [email or web address]   URL: [email or web address]   Date: 09/29/2020   Prepared by: Isaias Cane     Exercises   Circular Shoulder Pendulum with Table Support - 10 reps - 1-2 sets - 1-2x daily - 7x weekly   Seated Shoulder Flexion Towel Slide at Table Top - 10 reps - 1 sets - 10 hold - 2x daily - 7x weekly   Supported Elbow Flexion Extension PROM - 10 reps - 1 sets - 10 hold - 2x daily - 7x weekly   Putty Squeezes - 10 reps - 1 sets - 10 hold - 2x daily - 7x weekly   Seated Shoulder External Rotation AAROM with Cane and Hand in Neutral - 10 reps - 1 sets - 2x daily - 7x weekly   Seated Shoulder Shrugs - 10 reps - 1 sets - 1 - 2 hold - 2x daily - 7x weekly   Seated Scapular Retraction - 10 reps - 1 sets - 1-2 hold - 2x daily - 7x weekly     Access Code: [email or web address]   URL: [email or web address]   Date: 10/26/2020   Prepared by: Mckenzie Justice     Exercises   Standing Shoulder Flexion Wall Walk - 10 reps - 1 sets - 10 hold - 2x daily - 7x weekly   Standing Shoulder Abduction Slides at [address] - 10 reps - 1 sets - 10 hold - 2x daily - 7x weekly (hold for now)  Supine Shoulder External Rotation with Dowel - 10 reps - 1 sets - 10 hold - 2x daily - 7x weekly   Shoulder Flexion AAROM - 10 reps - 1 sets - 10 hold - 2x daily - 7x weekly   Supine Shoulder Press AAROM in Abduction with Dowel - 10 reps - 3 sets - 1x daily - 7x weekly   Isometric Shoulder Extension at Wall - 10 reps - 1 sets - 5 hold - 1-2x daily - 7x weekly   Isometric Shoulder Abduction at Wall - 10 reps - 1 sets - 5 hold - 1-2x daily - 7x weekly   Standing Isometric Shoulder Internal Rotation with Towel Roll at Doorway - 10 reps - 1 sets - 5 hold - 1-2x daily - 7x weekly   Standing Isometric Shoulder External Rotation with Doorway - 10 reps - 1 sets - 5 hold - 1-2x daily - 7x weekly       Therapeutic Exercise and NMR EXR  [x] (12647) Provided verbal/tactile cueing for activities related to strengthening, flexibility, endurance, ROM  for improvements in scapular, scapulothoracic and UE control with self care, reaching, carrying, lifting, house/yardwork, driving/computer work.    [] (36084) Provided verbal/tactile cueing for activities related to improving balance, coordination, kinesthetic sense, posture, motor skill, proprioception  to assist with  scapular, scapulothoracic and UE control with self care, reaching, carrying, lifting, house/yardwork, driving/computer work. Therapeutic Activities:    [] (22518 or 72428) Provided verbal/tactile cueing for activities related to improving balance, coordination, kinesthetic sense, posture, motor skill, proprioception and motor activation to allow for proper function of scapular, scapulothoracic and UE control with self care, carrying, lifting, driving/computer work. Home Exercise Program:    [x] (61731) Reviewed/Progressed HEP activities related to strengthening, flexibility, endurance, ROM of scapular, scapulothoracic and UE control with self care, reaching, carrying, lifting, house/yardwork, driving/computer work  [] (25339) Reviewed/Progressed HEP activities related to improving balance, coordination, kinesthetic sense, posture, motor skill, proprioception of scapular, scapulothoracic and UE control with self care, reaching, carrying, lifting, house/yardwork, driving/computer work      Manual Treatments:  PROM / STM / Oscillations-Mobs:  G-I, II, III, IV (PA's, Inf., Post.)  [x] (56090) Provided manual therapy to mobilize soft tissue/joints of cervical/CT, scapular GHJ and UE for the purpose of modulating pain, promoting relaxation,  increasing ROM, reducing/eliminating soft tissue swelling/inflammation/restriction, improving soft tissue extensibility and allowing for proper ROM for normal function with self care, reaching, carrying, lifting, house/yardwork, driving/computer work    Modalities:  Vaso GR to L shld x15 min after treatment.   Charges:  Timed Code Treatment Minutes: 39'   Total Treatment Minutes: 61'     [facility] time in/time out:     [] EVAL (LOW) 81639   [] EVAL (MOD) 65639   [] EVAL (HIGH) 91078   [] RE-EVAL     [x] RV(07327) x 1    [] IONTO  [x] NMR (52584) x  1  [x] VASO  [x] Manual (55114) x 1     [] Other:  [] TA x      [] [facility] Traction (59105)  [] ES(attended) (01487)      [] ES (un) (84896):     GOALS:   Patient stated goal: No pain  [x]? Progressing: []? Met: []? Not Met: []? Adjusted     Therapist goals for Patient:   Short Term Goals: To be achieved in: 2 weeks  1. Independent in HEP and progression per patient tolerance, in order to prevent re-injury. []? Progressing: [x]? Met: []? Not Met: []? Adjusted  2. Patient will have a decrease in pain to facilitate improvement in movement, function, and ADLs as indicated by Functional Deficits. []? Progressing: [x]? Met: []? Not Met: []? Adjusted     Long Term Goals: To be achieved in: 12 weeks  1. Disability index score of 30% or less for the [facility] to assist with reaching prior level of function. [x]? Progressing: []? Met: []? Not Met: []? Adjusted  2. Patient will demonstrate increased AROM to WNL L shld to allow for proper joint functioning as indicated by patients Functional Deficits. [x]? Progressing: []? Met: []? Not Met: []? Adjusted  3. Patient will demonstrate an increase in Strength to 3+/5 or better to allow for proper functional mobility as indicated by patients Functional Deficits. [x]? Progressing: []? Met: []? Not Met: []? Adjusted  4. Patient will return to reaching overhead functional activities without increased symptoms or restriction. [x]? Progressing: []? Met: []? Not Met: []? Adjusted  5. Reaching behind back without pain(patient specific functional goal)    []? Progressing: []? Met: [x]? Not Met: []? Adjusted       Progression Towards Functional goals:  [x] Patient is progressing as expected towards functional goals listed. [] Progression is slowed due to complexities listed. [] Progression has been slowed due to co-morbidities.   [] Plan just implemented, too soon to assess goals progression  [] Other:     ASSESSMENT:  Improving ROM- pt requires continued skilled intervention to restore ROM, strength, improve proprioception and functional endurance. Making progress toward goals. Initiate next phase. Overall Progression Towards Functional goals/ Treatment Progress Update:  [x] Patient is progressing as expected towards functional goals listed. [] Progression is slowed due to complexities/Impairments listed. [] Progression has been slowed due to co-morbidities. [] Plan just implemented, too soon to assess goals progression <30days   [] Goals require adjustment due to lack of progress  [] Patient is not progressing as expected and requires additional follow up with physician  [] Other    Prognosis for POC: [x] Good [] Fair  [] Poor      Patient requires continued skilled intervention: [x] Yes  [] No    Treatment/Activity Tolerance:  [x] Patient able to complete treatment  [] Patient limited by fatigue  [] Patient limited by pain    [] Patient limited by other medical complications  [] Other:         Return to Play: (if applicable)   [x]  Stage 1: Intro to Strength   []  Stage 2: Return to Run and Strength   []  Stage 3: Return to Jump and Strength   []  Stage 4: Dynamic Strength and Agility   []  Stage 5: Sport Specific Training     []  Ready to Return to Play, Meets All Above Stages   []  Not Ready for Return to Sports   Comments:                               PLAN: See eval. PROM per protocol for Posterior Labral repair. 1-2x/week. Progress strengthening as tolerated. [x] Continue per plan of care [] Alter current plan (see comments above)  [] Plan of care initiated [] Hold pending MD visit [] Discharge      Electronically signed by:  [place], [id number]    Note: If patient does not return for scheduled/ recommended follow up visits, this note will serve as a discharge from care along with most recent update on progress.

## 2020-10-29 ENCOUNTER — HOSPITAL ENCOUNTER (OUTPATIENT)
Dept: PHYSICAL THERAPY | Age: 25
Setting detail: THERAPIES SERIES
Discharge: HOME | End: 2020-10-29
Payer: COMMERCIAL

## 2020-10-29 PROCEDURE — 97110 THERAPEUTIC EXERCISES: CPT | Performed by: PHYSICAL THERAPIST

## 2020-10-29 PROCEDURE — 97140 MANUAL THERAPY 1/> REGIONS: CPT | Performed by: PHYSICAL THERAPIST

## 2020-10-29 PROCEDURE — 97016 VASOPNEUMATIC DEVICE THERAPY: CPT | Performed by: PHYSICAL THERAPIST

## 2020-10-29 PROCEDURE — 97112 NEUROMUSCULAR REEDUCATION: CPT | Performed by: PHYSICAL THERAPIST

## 2020-10-29 NOTE — FLOWSHEET NOTE
[facility], [address] [place]  Phone: [phone]  Fax [phone]        Date:  10/29/2020    Patient Name:  Leighton Rankin    :  1995  MRN: [id number]  Restrictions/Precautions:  Posterior labral Protocol. Sling  Medical/Treatment Diagnosis Information:  · Diagnosis: M25.312 (ICD-10-CM) - Shoulder instability, left , S43.432A (ICD-10-CM) - Anterior to posterior tear of superior glenoid labrum of left shoulder s/p L shld posterior labral repair on 20  · Treatment Diagnosis: M25.312 (ICD-10-CM) - Shoulder instability, left , I05.409O (ICD-10-CM) - Anterior to posterior tear of superior glenoid labrum of left shoulder s/p L shld posterior labral repair on 79  Insurance/Certification information:  PT Insurance Information: [employer]/60 PT/ no auth  Physician Information:  Referring Practitioner: Dr. Keagan Sterling  Has the plan of care been signed (Y/N):        []  Yes  [x]  No     Date of Patient follow up with Physician: 11-3-2020      Is this a Progress Report:     [x]  Yes  []  No        If Yes:  Date Range for reporting period:  Beginnin20  Zdikqz10/26/20    Progress report will be due (10 Rx or 30 days whichever is less): /      Recertification will be due (POC Duration  / 90 days whichever is less): 12 weeks      Visit # Insurance Allowable Auth Required   In-person 6 60PT []  Yes [x]  No    Telehealth   []  Yes []  No    Total            Functional Scale: Quick dash: 36% 27 score    Date assessed:  10/26/20      Therapy Diagnosis/Practice Pattern:4I      Number of Comorbidities:  []0     []1-2    []3    Latex Allergy:  [x]NO      []YES  Preferred Language for Healthcare:   [x]English       []other:      Pain level: 0-3 /10     SUBJECTIVE:  Shoulder got tight after last time but exercises help to stretch.     OBJECTIVE:    Observation:     Test measurements:  3 weeks post-op 10/7/20  OBJECTIVE  Test used Initial score 10/26/20   Pain Summary 0-10 7-10 0-3   Functional questionnaire Quick Dash 73% 36%   Functional Testing            ROM PROM Flex 80 150    PROM scap  155-160    PROM Er 0 78   Strength AROM flex  135-140    AROM abd  75 deg       RESTRICTIONS/PRECAUTIONS:   Date of Procedure: 9/16/2020     Pre-Op Diagnosis: SHOULDER INSTABILITY, LEFT     Post-Op Diagnosis: Posterior labral tear, degenerative superior labral tear, low-grade articular sided fraying along the articular surface of the supraspinatus consistent with an i internal impingement syndrome       Procedure(s):  LEFT SHOULDER VIDEO ARTHROSCOPY WITH POSTERIOR LABRAL RECONSTRUCTION. LIMITED DEBRIDEMENT ROTATOR CUFF.     #1.  Left shoulder arthroscopy with posterior labral repair; 64337  #2.  Left shoulder arthroscopy with a limited debridement including the articular surface of the supraspinatus and superior labrum;       Exercises/Interventions:     Therapeutic Ex (49835) Sets/sec Reps Notes/CUES   Pulleys H5 15 reps    Shrugs/scap retraction  10 reps    Wall slides flexion: 11,12,1  8 ea    Table slides  SB flex/ scaption   LXX, RXX    ER (Supine) H10 10 reps    Corner (s) 30\" 5    Cane flexion 10\" 10 reps    T spine sling matrix    Supine CW/CCW/ A-P/med-lat  15 reps ea    Prone ext  Prone row     punch 2 10    No money  BW 15 reps    Manual Intervention (60148)      Prom L shld /Grade 1-2 joint mobs, CF to biceps tendon 10'           Manual RS at 90 deg supine 15 reps X 3                       NMR re-education (17105)   CUES NEEDED   Patient education on sling/ precautions/HEP/restrictions and surgery education 10'           Scap clocks 12 reps                                         Therapeutic Activity (64159)                                          Access Code: [email or web address]   URL: [email or web address]   Date: 09/29/2020   Prepared by: Clotilde Samano     Exercises   Circular Shoulder Pendulum with Table Support - 10 reps - 1-2 sets - 1-2x daily - 7x weekly   Seated Shoulder Flexion Towel Slide at Table Top - 10 reps - 1 sets - 10 hold - 2x daily - 7x weekly   Supported Elbow Flexion Extension PROM - 10 reps - 1 sets - 10 hold - 2x daily - 7x weekly   Putty Squeezes - 10 reps - 1 sets - 10 hold - 2x daily - 7x weekly   Seated Shoulder External Rotation AAROM with Cane and Hand in Neutral - 10 reps - 1 sets - 2x daily - 7x weekly   Seated Shoulder Shrugs - 10 reps - 1 sets - 1 - 2 hold - 2x daily - 7x weekly   Seated Scapular Retraction - 10 reps - 1 sets - 1-2 hold - 2x daily - 7x weekly     Access Code: [email or web address]   URL: [email or web address]   Date: 10/26/2020   Prepared by: Mckenzie Justice     Exercises   Standing Shoulder Flexion Wall Walk - 10 reps - 1 sets - 10 hold - 2x daily - 7x weekly   Standing Shoulder Abduction Slides at Wall - 10 reps - 1 sets - 10 hold - 2x daily - 7x weekly (hold for now)  Supine Shoulder External Rotation with Dowel - 10 reps - 1 sets - 10 hold - 2x daily - 7x weekly   Shoulder Flexion AAROM - 10 reps - 1 sets - 10 hold - 2x daily - 7x weekly   Supine Shoulder Press AAROM in Abduction with Dowel - 10 reps - 3 sets - 1x daily - 7x weekly   Isometric Shoulder Extension at Wall - 10 reps - 1 sets - 5 hold - 1-2x daily - 7x weekly   Isometric Shoulder Abduction at Wall - 10 reps - 1 sets - 5 hold - 1-2x daily - 7x weekly   Standing Isometric Shoulder Internal Rotation with Towel Roll at Doorway - 10 reps - 1 sets - 5 hold - 1-2x daily - 7x weekly   Standing Isometric Shoulder External Rotation with Doorway - 10 reps - 1 sets - 5 hold - 1-2x daily - 7x weekly       Therapeutic Exercise and NMR EXR  [x] (35242) Provided verbal/tactile cueing for activities related to strengthening, flexibility, endurance, ROM  for improvements in scapular, scapulothoracic and UE control with self care, reaching, carrying, lifting, house/yardwork, driving/computer work.    [] (38737) Provided verbal/tactile cueing for activities related to improving balance, coordination, kinesthetic sense, posture, motor skill, proprioception  to assist with  scapular, scapulothoracic and UE control with self care, reaching, carrying, lifting, house/yardwork, driving/computer work. Therapeutic Activities:    [] (66081 or 11728) Provided verbal/tactile cueing for activities related to improving balance, coordination, kinesthetic sense, posture, motor skill, proprioception and motor activation to allow for proper function of scapular, scapulothoracic and UE control with self care, carrying, lifting, driving/computer work. Home Exercise Program:    [x] (92852) Reviewed/Progressed HEP activities related to strengthening, flexibility, endurance, ROM of scapular, scapulothoracic and UE control with self care, reaching, carrying, lifting, house/yardwork, driving/computer work  [] (70931) Reviewed/Progressed HEP activities related to improving balance, coordination, kinesthetic sense, posture, motor skill, proprioception of scapular, scapulothoracic and UE control with self care, reaching, carrying, lifting, house/yardwork, driving/computer work      Manual Treatments:  PROM / STM / Oscillations-Mobs:  G-I, II, III, IV (PA's, Inf., Post.)  [x] (79437) Provided manual therapy to mobilize soft tissue/joints of cervical/CT, scapular GHJ and UE for the purpose of modulating pain, promoting relaxation,  increasing ROM, reducing/eliminating soft tissue swelling/inflammation/restriction, improving soft tissue extensibility and allowing for proper ROM for normal function with self care, reaching, carrying, lifting, house/yardwork, driving/computer work    Modalities:  Vaso GR to L shld x15 min after treatment.   Charges:  Timed Code Treatment Minutes: 39'   Total Treatment Minutes: 61'     [facility] time in/time out:     [] EVAL (LOW) 72433   [] EVAL (MOD) 31459   [] EVAL (HIGH) 27372   [] RE-EVAL     [x] ZH(00354) x 1    [] IONTO  [x] intervention to restore ROM, strength, improve proprioception and functional endurance. Making progress toward goals. Continue- AAROM-> AROM as able. Overall Progression Towards Functional goals/ Treatment Progress Update:  [x] Patient is progressing as expected towards functional goals listed. [] Progression is slowed due to complexities/Impairments listed. [] Progression has been slowed due to co-morbidities. [] Plan just implemented, too soon to assess goals progression <30days   [] Goals require adjustment due to lack of progress  [] Patient is not progressing as expected and requires additional follow up with physician  [] Other    Prognosis for POC: [x] Good [] Fair  [] Poor      Patient requires continued skilled intervention: [x] Yes  [] No    Treatment/Activity Tolerance:  [x] Patient able to complete treatment  [] Patient limited by fatigue  [] Patient limited by pain    [] Patient limited by other medical complications  [] Other:         Return to Play: (if applicable)   [x]  Stage 1: Intro to Strength   []  Stage 2: Return to Run and Strength   []  Stage 3: Return to Jump and Strength   []  Stage 4: Dynamic Strength and Agility   []  Stage 5: Sport Specific Training     []  Ready to Return to Play, Meets All Above Stages   []  Not Ready for Return to Sports   Comments:                               PLAN: See eval. PROM per protocol for Posterior Labral repair. 1-2x/week. Progress strengthening as tolerated. [x] Continue per plan of care [] Alter current plan (see comments above)  [] Plan of care initiated [] Hold pending MD visit [] Discharge      Electronically signed by:  [place], [id number]    Note: If patient does not return for scheduled/ recommended follow up visits, this note will serve as a discharge from care along with most recent update on progress.

## 2020-11-02 ENCOUNTER — HOSPITAL ENCOUNTER (OUTPATIENT)
Dept: PHYSICAL THERAPY | Age: 25
Setting detail: THERAPIES SERIES
Discharge: HOME | End: 2020-11-02
Payer: COMMERCIAL

## 2020-11-02 NOTE — FLOWSHEET NOTE
[facility], [address], [address] [place] [address]        Physical Therapy  Cancellation/No-show Note  Patient Name:  Negin Castano  :  1995   Date:  2020  Cancelled visits to date:   No-shows to date: 3    For today's appointment patient:  ?  Cancelled  ? Rescheduled appointment  ? No-show     Reason given by patient:  ?  Patient ill  ? Conflicting appointment  ? No transportation    ? Conflict with work  ? No reason given  ?   Other:     Comments:      Electronically signed by:  Zaria Blair, [address] [place] [address]

## 2020-11-03 ENCOUNTER — OFFICE VISIT (OUTPATIENT)
Dept: ORTHOPEDIC SURGERY | Age: 25
End: 2020-11-03

## 2020-11-03 ENCOUNTER — HOSPITAL ENCOUNTER (OUTPATIENT)
Dept: PHYSICAL THERAPY | Age: 25
Setting detail: THERAPIES SERIES
Discharge: HOME | End: 2020-11-03
Payer: COMMERCIAL

## 2020-11-03 VITALS — HEIGHT: 72 IN | WEIGHT: 217 LBS | BODY MASS INDEX: 29.39 KG/M2

## 2020-11-03 PROCEDURE — 99024 POSTOP FOLLOW-UP VISIT: CPT | Performed by: ORTHOPAEDIC SURGERY

## 2020-11-03 PROCEDURE — 97140 MANUAL THERAPY 1/> REGIONS: CPT | Performed by: PHYSICAL THERAPIST

## 2020-11-03 PROCEDURE — 97110 THERAPEUTIC EXERCISES: CPT | Performed by: PHYSICAL THERAPIST

## 2020-11-03 PROCEDURE — 97016 VASOPNEUMATIC DEVICE THERAPY: CPT | Performed by: PHYSICAL THERAPIST

## 2020-11-03 RX ORDER — HYDROCODONE BITARTRATE AND ACETAMINOPHEN 5; 325 MG/1; MG/1
1 TABLET ORAL EVERY 8 HOURS PRN
Qty: 18 TABLET | Refills: 0 | Status: SHIPPED | OUTPATIENT
Start: 2020-11-03 | End: 2020-11-10

## 2020-11-03 NOTE — LETTER
[address] [place] [address] Dr Domonique Tyson [facility] [address]  Phone: [phone]  Fax: [phone]    Kyle Lopez MD        November 3, 2020     Patient: Romel Angel   YOB: 1995   Date of Visit: 11/3/2020       To Whom It May Concern: It is my medical opinion that Romel Angel may return to full duty immediately with no restrictions. If you have any questions or concerns, please don't hesitate to call.     Sincerely,      Kyle Lopez MD

## 2020-11-03 NOTE — FLOWSHEET NOTE
[facility], [address]  Phone: [phone]  Fax [phone]        Date:  11/3/2020    Patient Name:  Ingrid Camargo    :  1995  MRN: [id number]  Restrictions/Precautions:  Posterior labral Protocol. Sling  Medical/Treatment Diagnosis Information:  · Diagnosis: M25.312 (ICD-10-CM) - Shoulder instability, left , S43.432A (ICD-10-CM) - Anterior to posterior tear of superior glenoid labrum of left shoulder s/p L shld posterior labral repair on 20  · Treatment Diagnosis: M25.312 (ICD-10-CM) - Shoulder instability, left , P83.266C (ICD-10-CM) - Anterior to posterior tear of superior glenoid labrum of left shoulder s/p L shld posterior labral repair on   Insurance/Certification information:  PT Insurance Information: Aet[employer]/60 PT/ no auth  Physician Information:  Referring Practitioner: Dr. Bradley Welsh  Has the plan of care been signed (Y/N):        []  Yes  [x]  No     Date of Patient follow up with Physician: 11-3-2020      Is this a Progress Report:     []  Yes  [x]  No        If Yes:  Date Range for reporting period:  Beginnin20  Gzryeg67/26/20    Progress report will be due (10 Rx or 30 days whichever is less): 69/40/55      Recertification will be due (POC Duration  / 90 days whichever is less): 12 weeks      Visit # Insurance Allowable Auth Required   In-person 7 60PT []  Yes [x]  No    Telehealth   []  Yes []  No    Total            Functional Scale: Quick dash: 36% 27 score    Date assessed:  10/26/20      Therapy Diagnosis/Practice Pattern:4I      Number of Comorbidities:  []0     []1-2    []3    Latex Allergy:  [x]NO      []YES  Preferred Language for Healthcare:   [x]English       []other:      Pain level: 0-3 /10     SUBJECTIVE:  Saw MD this morning- pleased with progress.      OBJECTIVE:    Observation:     Test measurements:  3 weeks post-op 10/7/20  OBJECTIVE  Test used Initial score 10/26/20   Pain Summary 0-10 7-10 0-3   Functional questionnaire Quick Dash 73% 36%   Functional Testing            ROM PROM Flex 80 150    PROM scap  155-160    PROM Er 0 78   Strength AROM flex  135-140    AROM abd  75 deg       RESTRICTIONS/PRECAUTIONS:   Date of Procedure: 9/16/2020     Pre-Op Diagnosis: SHOULDER INSTABILITY, LEFT     Post-Op Diagnosis: Posterior labral tear, degenerative superior labral tear, low-grade articular sided fraying along the articular surface of the supraspinatus consistent with an i internal impingement syndrome       Procedure(s):  LEFT SHOULDER VIDEO ARTHROSCOPY WITH POSTERIOR LABRAL RECONSTRUCTION. LIMITED DEBRIDEMENT ROTATOR CUFF.     #1.  Left shoulder arthroscopy with posterior labral repair; 60690  #2.  Left shoulder arthroscopy with a limited debridement including the articular surface of the supraspinatus and superior labrum;       Exercises/Interventions:     Therapeutic Ex (31604) Sets/sec Reps Notes/CUES   Pulleys H5 15 reps    Shrugs/scap retraction  10 reps    Wall slides flexion: 11,12,1  8 ea    Table slides  SB flex/ scaption   LXX, RXX    ER (Supine) H10 10 reps    Corner (s) 30\" 5    Cane flexion 10\" 10 reps    TB row/ ext GTB  2 x 10 ea    Supine CW/CCW/ A-P/med-lat  15 reps ea    SL ABD  SL H ABD 10 reps ea   Prone ext  Prone row     punch 2 10    No money  BW/ 2  15 reps    Manual Intervention (17498)      Prom L shld /Grade 1-2 joint mobs, CF to biceps tendon 10'           Manual RS at 90 deg supine 20 reps X 3                       NMR re-education (27223)   CUES NEEDED   Patient education on sling/ precautions/HEP/restrictions and surgery education 10'           Scap clocks 12 reps                                         Therapeutic Activity (68349)      Finisher (4#B): CW,CCW, B flex, ladders, threading 10 B                                  Access Code: [email or web address]   URL: [email or web address]   Date: 09/29/2020   Prepared by: Patricia Alejo Nasim     Exercises   Circular Shoulder Pendulum with Table Support - 10 reps - 1-2 sets - 1-2x daily - 7x weekly   Seated Shoulder Flexion Towel Slide at Table Top - 10 reps - 1 sets - 10 hold - 2x daily - 7x weekly   Supported Elbow Flexion Extension PROM - 10 reps - 1 sets - 10 hold - 2x daily - 7x weekly   Putty Squeezes - 10 reps - 1 sets - 10 hold - 2x daily - 7x weekly   Seated Shoulder External Rotation AAROM with Cane and Hand in Neutral - 10 reps - 1 sets - 2x daily - 7x weekly   Seated Shoulder Shrugs - 10 reps - 1 sets - 1 - 2 hold - 2x daily - 7x weekly   Seated Scapular Retraction - 10 reps - 1 sets - 1-2 hold - 2x daily - 7x weekly     Access Code: [email or web address]   URL: [email or web address]   Date: 10/26/2020   Prepared by: Nehal Pittman     Exercises   Standing Shoulder Flexion Wall Walk - 10 reps - 1 sets - 10 hold - 2x daily - 7x weekly   Standing Shoulder Abduction Slides at Wall - 10 reps - 1 sets - 10 hold - 2x daily - 7x weekly (hold for now)  Supine Shoulder External Rotation with Dowel - 10 reps - 1 sets - 10 hold - 2x daily - 7x weekly   Shoulder Flexion AAROM - 10 reps - 1 sets - 10 hold - 2x daily - 7x weekly   Supine Shoulder Press AAROM in Abduction with Dowel - 10 reps - 3 sets - 1x daily - 7x weekly   Isometric Shoulder Extension at Wall - 10 reps - 1 sets - 5 hold - 1-2x daily - 7x weekly   Isometric Shoulder Abduction at Wall - 10 reps - 1 sets - 5 hold - 1-2x daily - 7x weekly   Standing Isometric Shoulder Internal Rotation with Towel Roll at Doorway - 10 reps - 1 sets - 5 hold - 1-2x daily - 7x weekly   Standing Isometric Shoulder External Rotation with Doorway - 10 reps - 1 sets - 5 hold - 1-2x daily - 7x weekly       Therapeutic Exercise and NMR EXR  [x] (00192) Provided verbal/tactile cueing for activities related to strengthening, flexibility, endurance, ROM  for improvements in scapular, scapulothoracic and UE control with self care, reaching, carrying, lifting, house/yardwork, RE-EVAL     [x] OD(27222) x 1    [] IONTO  [x] NMR (36007) x  1  [x] VASO  [x] Manual (42054) x 1     [] Other:  [] TA x      [] Mech Traction (93777)  [] ES(attended) (34780)      [] ES (un) (60755):     GOALS:   Patient stated goal: No pain  [x]? Progressing: []? Met: []? Not Met: []? Adjusted     Therapist goals for Patient:   Short Term Goals: To be achieved in: 2 weeks  1. Independent in HEP and progression per patient tolerance, in order to prevent re-injury. []? Progressing: [x]? Met: []? Not Met: []? Adjusted  2. Patient will have a decrease in pain to facilitate improvement in movement, function, and ADLs as indicated by Functional Deficits. []? Progressing: [x]? Met: []? Not Met: []? Adjusted     Long Term Goals: To be achieved in: 12 weeks  1. Disability index score of 30% or less for the [facility] to assist with reaching prior level of function. [x]? Progressing: []? Met: []? Not Met: []? Adjusted  2. Patient will demonstrate increased AROM to WNL L shld to allow for proper joint functioning as indicated by patients Functional Deficits. [x]? Progressing: []? Met: []? Not Met: []? Adjusted  3. Patient will demonstrate an increase in Strength to 3+/5 or better to allow for proper functional mobility as indicated by patients Functional Deficits. [x]? Progressing: []? Met: []? Not Met: []? Adjusted  4. Patient will return to reaching overhead functional activities without increased symptoms or restriction. [x]? Progressing: []? Met: []? Not Met: []? Adjusted  5. Reaching behind back without pain(patient specific functional goal)    []? Progressing: []? Met: [x]? Not Met: []? Adjusted       Progression Towards Functional goals:  [x] Patient is progressing as expected towards functional goals listed. [] Progression is slowed due to complexities listed. [] Progression has been slowed due to co-morbidities.   [] Plan just implemented, too soon to assess goals progression  [] Other:     ASSESSMENT: Improving ROM- pt requires continued skilled intervention to restore ROM, strength, improve proprioception and functional endurance. Making progress toward goals. Continue- AAROM-> AROM as able. Overall Progression Towards Functional goals/ Treatment Progress Update:  [x] Patient is progressing as expected towards functional goals listed. [] Progression is slowed due to complexities/Impairments listed. [] Progression has been slowed due to co-morbidities. [] Plan just implemented, too soon to assess goals progression <30days   [] Goals require adjustment due to lack of progress  [] Patient is not progressing as expected and requires additional follow up with physician  [] Other    Prognosis for POC: [x] Good [] Fair  [] Poor      Patient requires continued skilled intervention: [x] Yes  [] No    Treatment/Activity Tolerance:  [x] Patient able to complete treatment  [] Patient limited by fatigue  [] Patient limited by pain    [] Patient limited by other medical complications  [] Other:         Return to Play: (if applicable)   [x]  Stage 1: Intro to Strength   []  Stage 2: Return to Run and Strength   []  Stage 3: Return to Jump and Strength   []  Stage 4: Dynamic Strength and Agility   []  Stage 5: Sport Specific Training     []  Ready to Return to Play, Meets All Above Stages   []  Not Ready for Return to Sports   Comments:                               PLAN: See eval. PROM per protocol for Posterior Labral repair. 1-2x/week. Progress strengthening as tolerated. [x] Continue per plan of care [] Alter current plan (see comments above)  [] Plan of care initiated [] Hold pending MD visit [] Discharge      Electronically signed by:  [place], [id number]    Note: If patient does not return for scheduled/ recommended follow up visits, this note will serve as a discharge from care along with most recent update on progress.

## 2020-11-03 NOTE — PROGRESS NOTES
Surgery: Left shoulder labral repair    Post-Op Week: 6    Chief Complaint:  Follow-up (LEFT SHOULDER)      History of Present of Illness: Patient seems to be making good improvement. Still has some soreness with physical therapy. Some night pain. No complications. Review of Systems  Pertinent items are noted in HPI  Denies fever, chills, confusion, bowel/bladder active change. Review of systems reviewed from Patient History Form dated on November 3 and available in the patient's chart under the Media tab. Examination: Forward elevation 110 degrees, abduction 90 degrees, X rotation the side 45 degrees. Good isometrics. Portals benign. Radiology:     None    Orders Placed This Encounter   Procedures    [facility] Physical Therapy     Referral Priority:   Routine     Referral Type:   Eval and Treat     Referral Reason:   Specialty Services Required     Requested Specialty:   Physical Therapy     Number of Visits Requested:   1       Impression:  Recovering well after labral repair      Treatment Plan:  We discussed the fact that he needs to be tapering off of his pain medications. The importance of using his adjunct if treatments such as soft tissue care and nonsteroidals. The importance of his frequent home exercises. We have encouraged him to advance into the next phase of active movement light strengthening. No quick dynamic movements. We will see him back in a month          [address] [facility] and Sports Medicine Surgery     This dictation was performed with a verbal recognition program (DRAGON) and it was checked for errors. It is possible that there are still dictated errors within this office note. If so, please bring any errors to my attention for an addendum. All efforts were made to ensure that this office note is accurate.

## 2020-11-05 ENCOUNTER — HOSPITAL ENCOUNTER (OUTPATIENT)
Dept: PHYSICAL THERAPY | Age: 25
Setting detail: THERAPIES SERIES
Discharge: HOME | End: 2020-11-05
Payer: COMMERCIAL

## 2020-11-05 PROCEDURE — 97016 VASOPNEUMATIC DEVICE THERAPY: CPT | Performed by: PHYSICAL THERAPIST

## 2020-11-05 PROCEDURE — 97110 THERAPEUTIC EXERCISES: CPT | Performed by: PHYSICAL THERAPIST

## 2020-11-05 PROCEDURE — 97140 MANUAL THERAPY 1/> REGIONS: CPT | Performed by: PHYSICAL THERAPIST

## 2020-11-05 PROCEDURE — 97112 NEUROMUSCULAR REEDUCATION: CPT | Performed by: PHYSICAL THERAPIST

## 2020-11-05 NOTE — FLOWSHEET NOTE
[facility] [facility], [address] [place]  Phone: [phone]  Fax [phone]        Date:  2020    Patient Name:  Emelina Carlson    :  1995  MRN: [id number]  Restrictions/Precautions:  Posterior labral Protocol. Sling  Medical/Treatment Diagnosis Information:  · Diagnosis: M25.312 (ICD-10-CM) - Shoulder instability, left , S43.432A (ICD-10-CM) - Anterior to posterior tear of superior glenoid labrum of left shoulder s/p L shld posterior labral repair on 20  · Treatment Diagnosis: M25.312 (ICD-10-CM) - Shoulder instability, left , R54.049Z (ICD-10-CM) - Anterior to posterior tear of superior glenoid labrum of left shoulder s/p L shld posterior labral repair on 7/15/83  Insurance/Certification information:  PT Insurance Information: Aetna/60 PT/ no auth  Physician Information:  Referring Practitioner: Dr. Milla Wells  Has the plan of care been signed (Y/N):        []  Yes  [x]  No     Date of Patient follow up with Physician: 11-3-2020      Is this a Progress Report:     []  Yes  [x]  No        If Yes:  Date Range for reporting period:  Beginnin20  Qogrig58/26/20    Progress report will be due (10 Rx or 30 days whichever is less):       Recertification will be due (POC Duration  / 90 days whichever is less): 12 weeks      Visit # Insurance Allowable Auth Required   In-person 7 60PT []  Yes [x]  No    Telehealth   []  Yes []  No    Total            Functional Scale: Quick dash: 36% 27 score    Date assessed:  10/26/20      Therapy Diagnosis/Practice Pattern:4I      Number of Comorbidities:  []0     []1-2    []3    Latex Allergy:  [x]NO      []YES  Preferred Language for Healthcare:   [x]English       []other:      Pain level: 0-3 /10     SUBJECTIVE:  \"Good sore, like I went to the gym\" after last session. Sleeping is still difficult.      OBJECTIVE:    Observation:     Test measurements:  3 weeks [id number]   URL: O[employer]v/   Date: 09/29/2020   Prepared by: Ashlyn Beltran     Exercises   Circular Shoulder Pendulum with Table Support - 10 reps - 1-2 sets - 1-2x daily - 7x weekly   Seated Shoulder Flexion Towel Slide at Table Top - 10 reps - 1 sets - 10 hold - 2x daily - 7x weekly   Supported Elbow Flexion Extension PROM - 10 reps - 1 sets - 10 hold - 2x daily - 7x weekly   Putty Squeezes - 10 reps - 1 sets - 10 hold - 2x daily - 7x weekly   Seated Shoulder External Rotation AAROM with Cane and Hand in Neutral - 10 reps - 1 sets - 2x daily - 7x weekly   Seated Shoulder Shrugs - 10 reps - 1 sets - 1 - 2 hold - 2x daily - 7x weekly   Seated Scapular Retraction - 10 reps - 1 sets - 1-2 hold - 2x daily - 7x weekly     Access Code: [email or web address]   URL: Osurv/   Date: 10/26/2020   Prepared by: Ashlyn Beltran     Exercises   Standing Shoulder Flexion Wall Walk - 10 reps - 1 sets - 10 hold - 2x daily - 7x weekly   Standing Shoulder Abduction Slides at Wall - 10 reps - 1 sets - 10 hold - 2x daily - 7x weekly (hold for now)  Supine Shoulder External Rotation with Dowel - 10 reps - 1 sets - 10 hold - 2x daily - 7x weekly   Shoulder Flexion AAROM - 10 reps - 1 sets - 10 hold - 2x daily - 7x weekly   Supine Shoulder Press AAROM in Abduction with Dowel - 10 reps - 3 sets - 1x daily - 7x weekly   Isometric Shoulder Extension at Wall - 10 reps - 1 sets - 5 hold - 1-2x daily - 7x weekly   Isometric Shoulder Abduction at Wall - 10 reps - 1 sets - 5 hold - 1-2x daily - 7x weekly   Standing Isometric Shoulder Internal Rotation with Towel Roll at Doorway - 10 reps - 1 sets - 5 hold - 1-2x daily - 7x weekly   Standing Isometric Shoulder External Rotation with Doorway - 10 reps - 1 sets - 5 hold - 1-2x daily - 7x weekly       Therapeutic Exercise and NMR EXR  [x] (30891) Provided verbal/tactile cueing for activities related to strengthening, flexibility, endurance, ROM  for improvements in scapular, scapulothoracic and UE control with self care, reaching, carrying, lifting, house/yardwork, driving/computer work.    [] (81420) Provided verbal/tactile cueing for activities related to improving balance, coordination, kinesthetic sense, posture, motor skill, proprioception  to assist with  scapular, scapulothoracic and UE control with self care, reaching, carrying, lifting, house/yardwork, driving/computer work. Therapeutic Activities:    [] (10925 or 98047) Provided verbal/tactile cueing for activities related to improving balance, coordination, kinesthetic sense, posture, motor skill, proprioception and motor activation to allow for proper function of scapular, scapulothoracic and UE control with self care, carrying, lifting, driving/computer work. Home Exercise Program:    [x] (11828) Reviewed/Progressed HEP activities related to strengthening, flexibility, endurance, ROM of scapular, scapulothoracic and UE control with self care, reaching, carrying, lifting, house/yardwork, driving/computer work  [] (54079) Reviewed/Progressed HEP activities related to improving balance, coordination, kinesthetic sense, posture, motor skill, proprioception of scapular, scapulothoracic and UE control with self care, reaching, carrying, lifting, house/yardwork, driving/computer work      Manual Treatments:  PROM / STM / Oscillations-Mobs:  G-I, II, III, IV (PA's, Inf., Post.)  [x] (12564) Provided manual therapy to mobilize soft tissue/joints of cervical/CT, scapular GHJ and UE for the purpose of modulating pain, promoting relaxation,  increasing ROM, reducing/eliminating soft tissue swelling/inflammation/restriction, improving soft tissue extensibility and allowing for proper ROM for normal function with self care, reaching, carrying, lifting, house/yardwork, driving/computer work    Modalities:  Vaso GR to L shld x15 min after treatment.   Charges:  Timed Code Treatment Minutes: 39'   Total Treatment Minutes: 61'     [facility] time in/time out:     [] EVAL (LOW) 46426   [] EVAL (MOD) 07456   [] EVAL (HIGH) 36588   [] RE-EVAL     [x] GUY(08250) x 1    [] IONTO  [x] NMR (89055) x  1  [x] VASO  [x] Manual (18031) x 1     [] Other:  [] TA x      [] Mech Traction (20085)  [] ES(attended) (24568)      [] ES (un) (17122):     GOALS:   Patient stated goal: No pain  [x]? Progressing: []? Met: []? Not Met: []? Adjusted     Therapist goals for Patient:   Short Term Goals: To be achieved in: 2 weeks  1. Independent in HEP and progression per patient tolerance, in order to prevent re-injury. []? Progressing: [x]? Met: []? Not Met: []? Adjusted  2. Patient will have a decrease in pain to facilitate improvement in movement, function, and ADLs as indicated by Functional Deficits. []? Progressing: [x]? Met: []? Not Met: []? Adjusted     Long Term Goals: To be achieved in: 12 weeks  1. Disability index score of 30% or less for the [facility] to assist with reaching prior level of function. [x]? Progressing: []? Met: []? Not Met: []? Adjusted  2. Patient will demonstrate increased AROM to WNL L shld to allow for proper joint functioning as indicated by patients Functional Deficits. [x]? Progressing: []? Met: []? Not Met: []? Adjusted  3. Patient will demonstrate an increase in Strength to 3+/5 or better to allow for proper functional mobility as indicated by patients Functional Deficits. [x]? Progressing: []? Met: []? Not Met: []? Adjusted  4. Patient will return to reaching overhead functional activities without increased symptoms or restriction. [x]? Progressing: []? Met: []? Not Met: []? Adjusted  5. Reaching behind back without pain(patient specific functional goal)    []? Progressing: []? Met: [x]? Not Met: []? Adjusted       Progression Towards Functional goals:  [x] Patient is progressing as expected towards functional goals listed. [] Progression is slowed due to complexities listed.   [] Progression has been slowed due to co-morbidities. [] Plan just implemented, too soon to assess goals progression  [] Other:     ASSESSMENT:  Improving ROM- pt requires continued skilled intervention to restore ROM, strength, improve proprioception and functional endurance. Making progress toward goals. Continue- AAROM-> AROM as able. Overall Progression Towards Functional goals/ Treatment Progress Update:  [x] Patient is progressing as expected towards functional goals listed. [] Progression is slowed due to complexities/Impairments listed. [] Progression has been slowed due to co-morbidities. [] Plan just implemented, too soon to assess goals progression <30days   [] Goals require adjustment due to lack of progress  [] Patient is not progressing as expected and requires additional follow up with physician  [] Other    Prognosis for POC: [x] Good [] Fair  [] Poor      Patient requires continued skilled intervention: [x] Yes  [] No    Treatment/Activity Tolerance:  [x] Patient able to complete treatment  [] Patient limited by fatigue  [] Patient limited by pain    [] Patient limited by other medical complications  [] Other:         Return to Play: (if applicable)   [x]  Stage 1: Intro to Strength   []  Stage 2: Return to Run and Strength   []  Stage 3: Return to Jump and Strength   []  Stage 4: Dynamic Strength and Agility   []  Stage 5: Sport Specific Training     []  Ready to Return to Play, Meets All Above Stages   []  Not Ready for Return to Sports   Comments:                               PLAN: See eval. PROM per protocol for Posterior Labral repair. 1-2x/week. Progress strengthening as tolerated.   [x] Continue per plan of care [] Alter current plan (see comments above)  [] Plan of care initiated [] Hold pending MD visit [] Discharge      Electronically signed by:  Tori Lopes PT, [id number]    Note: If patient does not return for scheduled/ recommended follow up visits, this note will serve as a discharge from care along with

## 2020-11-09 ENCOUNTER — HOSPITAL ENCOUNTER (OUTPATIENT)
Dept: PHYSICAL THERAPY | Age: 25
Setting detail: THERAPIES SERIES
Discharge: HOME | End: 2020-11-09
Payer: COMMERCIAL

## 2020-11-09 PROCEDURE — 97112 NEUROMUSCULAR REEDUCATION: CPT | Performed by: PHYSICAL THERAPIST

## 2020-11-09 PROCEDURE — 97016 VASOPNEUMATIC DEVICE THERAPY: CPT | Performed by: PHYSICAL THERAPIST

## 2020-11-09 PROCEDURE — 97110 THERAPEUTIC EXERCISES: CPT | Performed by: PHYSICAL THERAPIST

## 2020-11-09 PROCEDURE — 97530 THERAPEUTIC ACTIVITIES: CPT | Performed by: PHYSICAL THERAPIST

## 2020-11-09 NOTE — FLOWSHEET NOTE
[facility], [address] [place]  Phone: [phone]  Fax [phone]        Date:  2020    Patient Name:  Cathie Payer    :  1995  MRN: [id number]  Restrictions/Precautions:  Posterior labral Protocol. Sling  Medical/Treatment Diagnosis Information:  · Diagnosis: M25.312 (ICD-10-CM) - Shoulder instability, left , S43.432A (ICD-10-CM) - Anterior to posterior tear of superior glenoid labrum of left shoulder s/p L shld posterior labral repair on 20  · Treatment Diagnosis: M25.312 (ICD-10-CM) - Shoulder instability, left , X83.527I (ICD-10-CM) - Anterior to posterior tear of superior glenoid labrum of left shoulder s/p L shld posterior labral repair on 97  Insurance/Certification information:  PT Insurance Information: [employer]/60 PT/ no auth  Physician Information:  Referring Practitioner: Dr. Chase Gonsalez  Has the plan of care been signed (Y/N):        []  Yes  [x]  No     Date of Patient follow up with Physician: 11-3-2020      Is this a Progress Report:     []  Yes  [x]  No        If Yes:  Date Range for reporting period:  Beginnin20  Izqdev59/26/20    Progress report will be due (10 Rx or 30 days whichever is less):       Recertification will be due (POC Duration  / 90 days whichever is less): 12 weeks      Visit # Insurance Allowable Auth Required   In-person 7 60PT []  Yes [x]  No    Telehealth   []  Yes []  No    Total            Functional Scale: Quick dash: 36% 27 score    Date assessed:  10/26/20      Therapy Diagnosis/Practice Pattern:4I      Number of Comorbidities:  []0     []1-2    []3    Latex Allergy:  [x]NO      []YES  Preferred Language for Healthcare:   [x]English       []other:      Pain level: 0-3 /10     SUBJECTIVE:  A little sore after last time but no pain.     OBJECTIVE:    Observation:     Test measurements:  3 weeks post-op 10/7/20  OBJECTIVE  Test used Initial score 10/26/20   Pain Summary 0-10 7-10 0-3   Functional questionnaire Quick Dash 73% 36%   Functional Testing            ROM PROM Flex 80 150    PROM scap  155-160    PROM Er 0 78   Strength AROM flex  135-140    AROM abd  75 deg       RESTRICTIONS/PRECAUTIONS:   Date of Procedure: 9/16/2020     Pre-Op Diagnosis: SHOULDER INSTABILITY, LEFT     Post-Op Diagnosis: Posterior labral tear, degenerative superior labral tear, low-grade articular sided fraying along the articular surface of the supraspinatus consistent with an i internal impingement syndrome       Procedure(s):  LEFT SHOULDER VIDEO ARTHROSCOPY WITH POSTERIOR LABRAL RECONSTRUCTION.  LIMITED DEBRIDEMENT ROTATOR CUFF.     #1.  Left shoulder arthroscopy with posterior labral repair; 39497  #2.  Left shoulder arthroscopy with a limited debridement including the articular surface of the supraspinatus and superior labrum;       Exercises/Interventions:     Therapeutic Ex (99396) Sets/sec Reps Notes/CUES   Airdyne bike  6-7 mins    Serratus wall slide   OVL scap retract  Lift Off OVL  2 x 10 ea    Wall slides flexion: 11,12,1  8 ea       ER (Supine) H10 10 reps    Corner (s)- 3 way 30\" ea    Cane flexion 10\" 10 reps    TB ER walkaway (RTB) H5 15    TB row/ ext BTB  2 x 15 ea       SL ABD  SL H ABD  SL ER (1#)  SL flex 2 10 reps ea    Prone ext  Prone row     punch 2 10          IR strap (s)- horizontal to midback  +into IR H10 5 ea    Pushup on wall 10  3D    No money  BW/ 2  15 reps    Manual Intervention (27175)                Manual RS at 90 deg supine 30 reps X 3                       NMR re-education (88325)   CUES NEEDED   Patient education on sling/ precautions/HEP/restrictions and surgery education 10'           Scap clocks 12 reps           D1 ext (GTB) 2 10          Mod plank at EOB w/ CL UE lift H5 15                Therapeutic Activity (05960)      Finisher (4#B): CW,CCW, B flex, ladders, threading 10 B                                  Access Code: [email or web address]   URL: [email or web address]   Date: 09/29/2020   Prepared by: Sherin Gomez     Exercises   Circular Shoulder Pendulum with Table Support - 10 reps - 1-2 sets - 1-2x daily - 7x weekly   Seated Shoulder Flexion Towel Slide at Table Top - 10 reps - 1 sets - 10 hold - 2x daily - 7x weekly   Supported Elbow Flexion Extension PROM - 10 reps - 1 sets - 10 hold - 2x daily - 7x weekly   Putty Squeezes - 10 reps - 1 sets - 10 hold - 2x daily - 7x weekly   Seated Shoulder External Rotation AAROM with Cane and Hand in Neutral - 10 reps - 1 sets - 2x daily - 7x weekly   Seated Shoulder Shrugs - 10 reps - 1 sets - 1 - 2 hold - 2x daily - 7x weekly   Seated Scapular Retraction - 10 reps - 1 sets - 1-2 hold - 2x daily - 7x weekly     Access Code: [email or web address]   URL: [email or web address]   Date: 10/26/2020   Prepared by: Sherin Gomez     Exercises   Standing Shoulder Flexion Wall Walk - 10 reps - 1 sets - 10 hold - 2x daily - 7x weekly   Standing Shoulder Abduction Slides at Wall - 10 reps - 1 sets - 10 hold - 2x daily - 7x weekly (hold for now)  Supine Shoulder External Rotation with Dowel - 10 reps - 1 sets - 10 hold - 2x daily - 7x weekly   Shoulder Flexion AAROM - 10 reps - 1 sets - 10 hold - 2x daily - 7x weekly   Supine Shoulder Press AAROM in Abduction with Dowel - 10 reps - 3 sets - 1x daily - 7x weekly   Isometric Shoulder Extension at Wall - 10 reps - 1 sets - 5 hold - 1-2x daily - 7x weekly   Isometric Shoulder Abduction at Wall - 10 reps - 1 sets - 5 hold - 1-2x daily - 7x weekly   Standing Isometric Shoulder Internal Rotation with Towel Roll at Doorway - 10 reps - 1 sets - 5 hold - 1-2x daily - 7x weekly   Standing Isometric Shoulder External Rotation with Doorway - 10 reps - 1 sets - 5 hold - 1-2x daily - 7x weekly       Therapeutic Exercise and NMR EXR  [x] (11228) Provided verbal/tactile cueing for activities related to strengthening, flexibility, endurance, ROM  for improvements in scapular, scapulothoracic and UE control with self care, reaching, carrying, lifting, house/yardwork, driving/computer work.    [] (05792) Provided verbal/tactile cueing for activities related to improving balance, coordination, kinesthetic sense, posture, motor skill, proprioception  to assist with  scapular, scapulothoracic and UE control with self care, reaching, carrying, lifting, house/yardwork, driving/computer work. Therapeutic Activities:    [] (89604 or 26709) Provided verbal/tactile cueing for activities related to improving balance, coordination, kinesthetic sense, posture, motor skill, proprioception and motor activation to allow for proper function of scapular, scapulothoracic and UE control with self care, carrying, lifting, driving/computer work. Home Exercise Program:    [x] (41669) Reviewed/Progressed HEP activities related to strengthening, flexibility, endurance, ROM of scapular, scapulothoracic and UE control with self care, reaching, carrying, lifting, house/yardwork, driving/computer work  [] (72432) Reviewed/Progressed HEP activities related to improving balance, coordination, kinesthetic sense, posture, motor skill, proprioception of scapular, scapulothoracic and UE control with self care, reaching, carrying, lifting, house/yardwork, driving/computer work      Manual Treatments:  PROM / STM / Oscillations-Mobs:  G-I, II, III, IV (PA's, Inf., Post.)  [x] (34638) Provided manual therapy to mobilize soft tissue/joints of cervical/CT, scapular GHJ and UE for the purpose of modulating pain, promoting relaxation,  increasing ROM, reducing/eliminating soft tissue swelling/inflammation/restriction, improving soft tissue extensibility and allowing for proper ROM for normal function with self care, reaching, carrying, lifting, house/yardwork, driving/computer work    Modalities:  Vaso GR to L shld x15 min after treatment.   Charges:  Timed Code Treatment Minutes: 39'   Total Treatment Minutes: 61'     [facility] time in/time out:     [] EVAL (LOW) 58639   [] EVAL (MOD) 13984   [] EVAL (HIGH) 23161   [] RE-EVAL     [x] VY(72773) x 1    [] IONTO  [x] NMR (54807) x  1  [x] VASO  [x] Manual (85775) x      [] Other:  [x] TA x  1    [] Mech Traction (57723)  [] ES(attended) (44119)      [] ES (un) (57758):     GOALS:   Patient stated goal: No pain  [x]? Progressing: []? Met: []? Not Met: []? Adjusted     Therapist goals for Patient:   Short Term Goals: To be achieved in: 2 weeks  1. Independent in HEP and progression per patient tolerance, in order to prevent re-injury. []? Progressing: [x]? Met: []? Not Met: []? Adjusted  2. Patient will have a decrease in pain to facilitate improvement in movement, function, and ADLs as indicated by Functional Deficits. []? Progressing: [x]? Met: []? Not Met: []? Adjusted     Long Term Goals: To be achieved in: 12 weeks  1. Disability index score of 30% or less for the [facility] to assist with reaching prior level of function. [x]? Progressing: []? Met: []? Not Met: []? Adjusted  2. Patient will demonstrate increased AROM to WNL L shld to allow for proper joint functioning as indicated by patients Functional Deficits. [x]? Progressing: []? Met: []? Not Met: []? Adjusted  3. Patient will demonstrate an increase in Strength to 3+/5 or better to allow for proper functional mobility as indicated by patients Functional Deficits. [x]? Progressing: []? Met: []? Not Met: []? Adjusted  4. Patient will return to reaching overhead functional activities without increased symptoms or restriction. [x]? Progressing: []? Met: []? Not Met: []? Adjusted  5. Reaching behind back without pain(patient specific functional goal)    []? Progressing: []? Met: [x]? Not Met: []? Adjusted       Progression Towards Functional goals:  [x] Patient is progressing as expected towards functional goals listed. [] Progression is slowed due to complexities listed.   [] Progression has been slowed due to co-morbidities. [] Plan just implemented, too soon to assess goals progression  [] Other:     ASSESSMENT:  Improving activity tolerance- pt requires continued skilled intervention to restore ROM, strength, improve proprioception and functional endurance. Making progress toward goals. Continue- AAROM-> AROM -> PRE as able. Overall Progression Towards Functional goals/ Treatment Progress Update:  [x] Patient is progressing as expected towards functional goals listed. [] Progression is slowed due to complexities/Impairments listed. [] Progression has been slowed due to co-morbidities. [] Plan just implemented, too soon to assess goals progression <30days   [] Goals require adjustment due to lack of progress  [] Patient is not progressing as expected and requires additional follow up with physician  [] Other    Prognosis for POC: [x] Good [] Fair  [] Poor      Patient requires continued skilled intervention: [x] Yes  [] No    Treatment/Activity Tolerance:  [x] Patient able to complete treatment  [] Patient limited by fatigue  [] Patient limited by pain    [] Patient limited by other medical complications  [] Other:         Return to Play: (if applicable)   [x]  Stage 1: Intro to Strength   []  Stage 2: Return to Run and Strength   []  Stage 3: Return to Jump and Strength   []  Stage 4: Dynamic Strength and Agility   []  Stage 5: Sport Specific Training     []  Ready to Return to Play, Meets All Above Stages   []  Not Ready for Return to Sports   Comments:                               PLAN: See eval. PROM per protocol for Posterior Labral repair. 1-2x/week. Progress strengthening as tolerated.   [x] Continue per plan of care [] Alter current plan (see comments above)  [] Plan of care initiated [] Hold pending MD visit [] Discharge      Electronically signed by:  Omero Cristobal PT, [id number]    Note: If patient does not return for scheduled/ recommended follow up visits, this note will serve as a discharge from care along with most recent update on progress.

## 2020-11-12 ENCOUNTER — HOSPITAL ENCOUNTER (OUTPATIENT)
Dept: PHYSICAL THERAPY | Age: 25
Setting detail: THERAPIES SERIES
Discharge: HOME | End: 2020-11-12
Payer: COMMERCIAL

## 2020-11-12 PROCEDURE — 97112 NEUROMUSCULAR REEDUCATION: CPT | Performed by: PHYSICAL THERAPIST

## 2020-11-12 PROCEDURE — 97110 THERAPEUTIC EXERCISES: CPT | Performed by: PHYSICAL THERAPIST

## 2020-11-12 PROCEDURE — 97016 VASOPNEUMATIC DEVICE THERAPY: CPT | Performed by: PHYSICAL THERAPIST

## 2020-11-12 NOTE — FLOWSHEET NOTE
Initial score 10/26/20   Pain Summary 0-10 7-10 0-3   Functional questionnaire Quick Dash 73% 36%   Functional Testing            ROM PROM Flex 80 150    PROM scap  155-160    PROM Er 0 78   Strength AROM flex  135-140    AROM abd  75 deg       RESTRICTIONS/PRECAUTIONS:   Date of Procedure: 9/16/2020     Pre-Op Diagnosis: SHOULDER INSTABILITY, LEFT     Post-Op Diagnosis: Posterior labral tear, degenerative superior labral tear, low-grade articular sided fraying along the articular surface of the supraspinatus consistent with an i internal impingement syndrome       Procedure(s):  LEFT SHOULDER VIDEO ARTHROSCOPY WITH POSTERIOR LABRAL RECONSTRUCTION.  LIMITED DEBRIDEMENT ROTATOR CUFF.     #1.  Left shoulder arthroscopy with posterior labral repair; 17615  #2.  Left shoulder arthroscopy with a limited debridement including the articular surface of the supraspinatus and superior labrum;       Exercises/Interventions:     Therapeutic Ex (52506) Sets/sec Reps Notes/CUES   Airdyne bike  6-7 mins    Serratus wall slide   OVL scap retract   Lift Off OVL  2 x 10 ea    OVL lat band walk (w/ RVL hip abd)  Bear walks  6 laps  10 laps       ER (Supine) H10 10 reps    Corner (s)- 3 way 30\" ea    Cane flexion 10\" 10 reps    TB ER walkaway (RTB) H5 15    TB row/ ext BTB  2 x 15 ea             Split stance SA row 20# 2 x 15    Shoulder ext (bar) 30# 2 x 15    Bent over lat pull (rope) 30# 2 x 15    IR strap (s)- horizontal to midback  +into IR H10 5 ea    Pushup on wall 10  3D    No money  BW/ 2  15 reps    Manual Intervention (69452)                                     NMR re-education (45240)   CUES NEEDED   Patient education on sling/ precautions/HEP/restrictions and surgery education 10'           Scap clocks 12 reps           D1 ext (BTB) 2 15    D2 flex (YTB) 2 15    Quadruped w/ CL UE lift/ shoulder taps H5 15    BOSU 3D scap protract/ retract  15 ea          Therapeutic Activity (78586) scapular, scapulothoracic and UE control with self care, reaching, carrying, lifting, house/yardwork, driving/computer work.    [] (51533) Provided verbal/tactile cueing for activities related to improving balance, coordination, kinesthetic sense, posture, motor skill, proprioception  to assist with  scapular, scapulothoracic and UE control with self care, reaching, carrying, lifting, house/yardwork, driving/computer work. Therapeutic Activities:    [] (92785 or 77035) Provided verbal/tactile cueing for activities related to improving balance, coordination, kinesthetic sense, posture, motor skill, proprioception and motor activation to allow for proper function of scapular, scapulothoracic and UE control with self care, carrying, lifting, driving/computer work. Home Exercise Program:    [x] (84173) Reviewed/Progressed HEP activities related to strengthening, flexibility, endurance, ROM of scapular, scapulothoracic and UE control with self care, reaching, carrying, lifting, house/yardwork, driving/computer work  [] (26055) Reviewed/Progressed HEP activities related to improving balance, coordination, kinesthetic sense, posture, motor skill, proprioception of scapular, scapulothoracic and UE control with self care, reaching, carrying, lifting, house/yardwork, driving/computer work      Manual Treatments:  PROM / STM / Oscillations-Mobs:  G-I, II, III, IV (PA's, Inf., Post.)  [x] (53715) Provided manual therapy to mobilize soft tissue/joints of cervical/CT, scapular GHJ and UE for the purpose of modulating pain, promoting relaxation,  increasing ROM, reducing/eliminating soft tissue swelling/inflammation/restriction, improving soft tissue extensibility and allowing for proper ROM for normal function with self care, reaching, carrying, lifting, house/yardwork, driving/computer work    Modalities:  Vaso GR to L shld x15 min after treatment.   Charges:  Timed Code Treatment Minutes: 39'   Total Treatment Minutes: 60'     [facility] time in/time out:     [] EVAL (LOW) 57828   [] EVAL (MOD) 75727   [] EVAL (HIGH) 69854   [] RE-EVAL     [x] WM(34431) x 2  [] IONTO  [x] NMR (65079) x  1  [x] VASO  [x] Manual (98557) x      [] Other:  [x] TA x     [] Mech Traction (63358)  [] ES(attended) (85715)      [] ES (un) (62500):     GOALS:   Patient stated goal: No pain  [x]? Progressing: []? Met: []? Not Met: []? Adjusted     Therapist goals for Patient:   Short Term Goals: To be achieved in: 2 weeks  1. Independent in HEP and progression per patient tolerance, in order to prevent re-injury. []? Progressing: [x]? Met: []? Not Met: []? Adjusted  2. Patient will have a decrease in pain to facilitate improvement in movement, function, and ADLs as indicated by Functional Deficits. []? Progressing: [x]? Met: []? Not Met: []? Adjusted     Long Term Goals: To be achieved in: 12 weeks  1. Disability index score of 30% or less for the [facility] to assist with reaching prior level of function. [x]? Progressing: []? Met: []? Not Met: []? Adjusted  2. Patient will demonstrate increased AROM to WNL L shld to allow for proper joint functioning as indicated by patients Functional Deficits. [x]? Progressing: []? Met: []? Not Met: []? Adjusted  3. Patient will demonstrate an increase in Strength to 3+/5 or better to allow for proper functional mobility as indicated by patients Functional Deficits. [x]? Progressing: []? Met: []? Not Met: []? Adjusted  4. Patient will return to reaching overhead functional activities without increased symptoms or restriction. [x]? Progressing: []? Met: []? Not Met: []? Adjusted  5. Reaching behind back without pain(patient specific functional goal)    []? Progressing: []? Met: [x]? Not Met: []? Adjusted       Progression Towards Functional goals:  [x] Patient is progressing as expected towards functional goals listed. [] Progression is slowed due to complexities listed.   [] Progression has been slowed due to co-morbidities. [] Plan just implemented, too soon to assess goals progression  [] Other:     ASSESSMENT:  Improving activity tolerance; additional CKC performed today without painful sx's- pt requires continued skilled intervention to restore ROM, strength, improve proprioception and functional endurance. Making progress toward goals. Continue- AAROM-> AROM -> PRE as able. Overall Progression Towards Functional goals/ Treatment Progress Update:  [x] Patient is progressing as expected towards functional goals listed. [] Progression is slowed due to complexities/Impairments listed. [] Progression has been slowed due to co-morbidities. [] Plan just implemented, too soon to assess goals progression <30days   [] Goals require adjustment due to lack of progress  [] Patient is not progressing as expected and requires additional follow up with physician  [] Other    Prognosis for POC: [x] Good [] Fair  [] Poor      Patient requires continued skilled intervention: [x] Yes  [] No    Treatment/Activity Tolerance:  [x] Patient able to complete treatment  [] Patient limited by fatigue  [] Patient limited by pain    [] Patient limited by other medical complications  [] Other:         Return to Play: (if applicable)   [x]  Stage 1: Intro to Strength   []  Stage 2: Return to Run and Strength   []  Stage 3: Return to Jump and Strength   []  Stage 4: Dynamic Strength and Agility   []  Stage 5: Sport Specific Training     []  Ready to Return to Play, Meets All Above Stages   []  Not Ready for Return to Sports   Comments:                               PLAN: See eval. PROM per protocol for Posterior Labral repair. 1-2x/week. Progress strengthening as tolerated.   [x] Continue per plan of care [] Alter current plan (see comments above)  [] Plan of care initiated [] Hold pending MD visit [] Discharge      Electronically signed by:  Vilma Cali, PT, [id number]    Note: If patient does not return for scheduled/ recommended follow up visits, this note will serve as a discharge from care along with most recent update on progress.

## 2020-11-16 ENCOUNTER — HOSPITAL ENCOUNTER (OUTPATIENT)
Dept: PHYSICAL THERAPY | Age: 25
Setting detail: THERAPIES SERIES
Discharge: HOME | End: 2020-11-16
Payer: COMMERCIAL

## 2020-11-16 PROCEDURE — 97110 THERAPEUTIC EXERCISES: CPT | Performed by: PHYSICAL THERAPIST

## 2020-11-16 PROCEDURE — 97016 VASOPNEUMATIC DEVICE THERAPY: CPT | Performed by: PHYSICAL THERAPIST

## 2020-11-16 PROCEDURE — 97112 NEUROMUSCULAR REEDUCATION: CPT | Performed by: PHYSICAL THERAPIST

## 2020-11-16 NOTE — FLOWSHEET NOTE
[facility], [address]  Phone: [phone]  Fax [phone]        Date:  2020    Patient Name:  Eliecer Black    :  1995  MRN: [id number]  Restrictions/Precautions:  Posterior labral Protocol. Sling  Medical/Treatment Diagnosis Information:  · Diagnosis: M25.312 (ICD-10-CM) - Shoulder instability, left , S43.432A (ICD-10-CM) - Anterior to posterior tear of superior glenoid labrum of left shoulder s/p L shld posterior labral repair on 20  · Treatment Diagnosis: M25.312 (ICD-10-CM) - Shoulder instability, left , R24.876Y (ICD-10-CM) - Anterior to posterior tear of superior glenoid labrum of left shoulder s/p L shld posterior labral repair on   Insurance/Certification information:  PT Insurance Information: [employer]/60 PT/ no auth  Physician Information:  Referring Practitioner: Dr. Rufus Oliveros  Has the plan of care been signed (Y/N):        []  Yes  [x]  No     Date of Patient follow up with Physician: 11-3-2020      Is this a Progress Report:     []  Yes  [x]  No        If Yes:  Date Range for reporting period:  Beginnin20  Hxcfgk97/26/20    Progress report will be due (10 Rx or 30 days whichever is less):       Recertification will be due (POC Duration  / 90 days whichever is less): 12 weeks      Visit # Insurance Allowable Auth Required   In-person 12 60PT []  Yes [x]  No    Telehealth   []  Yes []  No    Total            Functional Scale: Quick dash: 36% 27 score    Date assessed:  10/26/20      Therapy Diagnosis/Practice Pattern:4I      Number of Comorbidities:  []0     []1-2    []3    Latex Allergy:  [x]NO      []YES  Preferred Language for Healthcare:   [x]English       []other:      Pain level: 0-3 /10     SUBJECTIVE:  Shoulder blade sore after last session.      OBJECTIVE:    Observation:     Test measurements:  3 weeks post-op 10/7/20  OBJECTIVE  Test used Initial score 10/26/20   Pain Summary 0-10 7-10 0-3   Functional questionnaire Quick Dash 73% 36%   Functional Testing            ROM PROM Flex 80 150    PROM scap  155-160    PROM Er 0 78   Strength AROM flex  135-140    AROM abd  75 deg       RESTRICTIONS/PRECAUTIONS:   Date of Procedure: 9/16/2020     Pre-Op Diagnosis: SHOULDER INSTABILITY, LEFT     Post-Op Diagnosis: Posterior labral tear, degenerative superior labral tear, low-grade articular sided fraying along the articular surface of the supraspinatus consistent with an i internal impingement syndrome       Procedure(s):  LEFT SHOULDER VIDEO ARTHROSCOPY WITH POSTERIOR LABRAL RECONSTRUCTION.  LIMITED DEBRIDEMENT ROTATOR CUFF.     #1.  Left shoulder arthroscopy with posterior labral repair; 99850  #2.  Left shoulder arthroscopy with a limited debridement including the articular surface of the supraspinatus and superior labrum;       Exercises/Interventions:     Therapeutic Ex (05631) Sets/sec Reps Notes/CUES   Airdyne bike  6-7 mins    Serratus wall slide   OVL scap retract   Lift Off OVL  2 x 10 ea    OVL lat band walk (w/ RVL hip abd)  Bear walks  6 laps  10 laps       ER (Supine) H10 10 reps    Corner (s)- 3 way 30\" ea    Cane flexion 10\" 10 reps    TB ER walkaway (RTB) H5 15    TB row/ ext BTB  2 x 15 ea       Body blade: flex 90, ER 0, ABD 90 30\" 3 ea    Split stance SA row 20# 2 x 15    Shoulder ext (bar) 30# 2 x 15    Bent over lat pull (rope) 30# 2 x 15    IR strap (s)- horizontal to midback  +into IR H10 5 ea    Pushup on wall 10  3D    No money  BW/ 2  15 reps    Manual Intervention (45026)                                     NMR re-education (29490)   CUES NEEDED   Patient education on sling/ precautions/HEP/restrictions and surgery education 10'     SB \"I\", \"Y\", \"T\" 2 10 ea BW    Scap clocks 12 reps     Hug a Tree 3 10 GTB         Quadruped w/ CL UE lift/ shoulder taps  Quadruped Y balance H5 20  10 reps B    BOSU 3D scap protract/ retract  15 ea Therapeutic Activity (14917)                                       Access Code: [email or web address]   URL: [employer]/   Date: 09/29/2020   Prepared by: Sebastian Becker     Exercises   Circular Shoulder Pendulum with Table Support - 10 reps - 1-2 sets - 1-2x daily - 7x weekly   Seated Shoulder Flexion Towel Slide at Table Top - 10 reps - 1 sets - 10 hold - 2x daily - 7x weekly   Supported Elbow Flexion Extension PROM - 10 reps - 1 sets - 10 hold - 2x daily - 7x weekly   Putty Squeezes - 10 reps - 1 sets - 10 hold - 2x daily - 7x weekly   Seated Shoulder External Rotation AAROM with Cane and Hand in Neutral - 10 reps - 1 sets - 2x daily - 7x weekly   Seated Shoulder Shrugs - 10 reps - 1 sets - 1 - 2 hold - 2x daily - 7x weekly   Seated Scapular Retraction - 10 reps - 1 sets - 1-2 hold - 2x daily - 7x weekly     Access Code: [email or web address]   URL: [employer]/   Date: 10/26/2020   Prepared by: Sebastian Becker     Exercises   Standing Shoulder Flexion Wall Walk - 10 reps - 1 sets - 10 hold - 2x daily - 7x weekly   Standing Shoulder Abduction Slides at Wall - 10 reps - 1 sets - 10 hold - 2x daily - 7x weekly (hold for now)  Supine Shoulder External Rotation with Dowel - 10 reps - 1 sets - 10 hold - 2x daily - 7x weekly   Shoulder Flexion AAROM - 10 reps - 1 sets - 10 hold - 2x daily - 7x weekly   Supine Shoulder Press AAROM in Abduction with Dowel - 10 reps - 3 sets - 1x daily - 7x weekly   Isometric Shoulder Extension at Wall - 10 reps - 1 sets - 5 hold - 1-2x daily - 7x weekly   Isometric Shoulder Abduction at Wall - 10 reps - 1 sets - 5 hold - 1-2x daily - 7x weekly   Standing Isometric Shoulder Internal Rotation with Towel Roll at Doorway - 10 reps - 1 sets - 5 hold - 1-2x daily - 7x weekly   Standing Isometric Shoulder External Rotation with Doorway - 10 reps - 1 sets - 5 hold - 1-2x daily - 7x weekly       Therapeutic Exercise and NMR EXR  [x] (30628) Provided verbal/tactile cueing for activities related to strengthening, flexibility, endurance, ROM  for improvements in scapular, scapulothoracic and UE control with self care, reaching, carrying, lifting, house/yardwork, driving/computer work.    [] (59020) Provided verbal/tactile cueing for activities related to improving balance, coordination, kinesthetic sense, posture, motor skill, proprioception  to assist with  scapular, scapulothoracic and UE control with self care, reaching, carrying, lifting, house/yardwork, driving/computer work. Therapeutic Activities:    [] (37114 or 99620) Provided verbal/tactile cueing for activities related to improving balance, coordination, kinesthetic sense, posture, motor skill, proprioception and motor activation to allow for proper function of scapular, scapulothoracic and UE control with self care, carrying, lifting, driving/computer work.      Home Exercise Program:    [x] (26081) Reviewed/Progressed HEP activities related to strengthening, flexibility, endurance, ROM of scapular, scapulothoracic and UE control with self care, reaching, carrying, lifting, house/yardwork, driving/computer work  [] (19576) Reviewed/Progressed HEP activities related to improving balance, coordination, kinesthetic sense, posture, motor skill, proprioception of scapular, scapulothoracic and UE control with self care, reaching, carrying, lifting, house/yardwork, driving/computer work      Manual Treatments:  PROM / STM / Oscillations-Mobs:  G-I, II, III, IV (PA's, Inf., Post.)  [x] (07255) Provided manual therapy to mobilize soft tissue/joints of cervical/CT, scapular GHJ and UE for the purpose of modulating pain, promoting relaxation,  increasing ROM, reducing/eliminating soft tissue swelling/inflammation/restriction, improving soft tissue extensibility and allowing for proper ROM for normal function with self care, reaching, carrying, lifting, house/yardwork, driving/computer work    Modalities:  Vaso GR to L shld x15 min after is slowed due to complexities listed. [] Progression has been slowed due to co-morbidities. [] Plan just implemented, too soon to assess goals progression  [] Other:     ASSESSMENT:  Improving activity tolerance; additional CKC performed today without painful sx's- pt requires continued skilled intervention to restore ROM, strength, improve proprioception and functional endurance. Making progress toward goals. Continue- AAROM-> AROM -> PRE as able. Overall Progression Towards Functional goals/ Treatment Progress Update:  [x] Patient is progressing as expected towards functional goals listed. [] Progression is slowed due to complexities/Impairments listed. [] Progression has been slowed due to co-morbidities. [] Plan just implemented, too soon to assess goals progression <30days   [] Goals require adjustment due to lack of progress  [] Patient is not progressing as expected and requires additional follow up with physician  [] Other    Prognosis for POC: [x] Good [] Fair  [] Poor      Patient requires continued skilled intervention: [x] Yes  [] No    Treatment/Activity Tolerance:  [x] Patient able to complete treatment  [] Patient limited by fatigue  [] Patient limited by pain    [] Patient limited by other medical complications  [] Other:         Return to Play: (if applicable)   [x]  Stage 1: Intro to Strength   []  Stage 2: Return to Run and Strength   []  Stage 3: Return to Jump and Strength   []  Stage 4: Dynamic Strength and Agility   []  Stage 5: Sport Specific Training     []  Ready to Return to Play, Meets All Above Stages   []  Not Ready for Return to Sports   Comments:                               PLAN: See eval. PROM per protocol for Posterior Labral repair. 1-2x/week. Progress strengthening as tolerated.   [x] Continue per plan of care [] Alter current plan (see comments above)  [] Plan of care initiated [] Hold pending MD visit [] Discharge      Electronically signed by:  Nadira De León PT, [id number]    Note: If patient does not return for scheduled/ recommended follow up visits, this note will serve as a discharge from care along with most recent update on progress.

## 2020-11-19 ENCOUNTER — HOSPITAL ENCOUNTER (OUTPATIENT)
Dept: PHYSICAL THERAPY | Age: 25
Setting detail: THERAPIES SERIES
Discharge: HOME | End: 2020-11-19
Payer: COMMERCIAL

## 2020-11-19 PROCEDURE — 97110 THERAPEUTIC EXERCISES: CPT | Performed by: PHYSICAL THERAPIST

## 2020-11-19 PROCEDURE — 97016 VASOPNEUMATIC DEVICE THERAPY: CPT | Performed by: PHYSICAL THERAPIST

## 2020-11-19 PROCEDURE — 97112 NEUROMUSCULAR REEDUCATION: CPT | Performed by: PHYSICAL THERAPIST

## 2020-11-19 NOTE — FLOWSHEET NOTE
taps  Quadruped Y balance H5 20  10 reps B    BOSU 3D scap protract/ retract  15 ea    Bird dog 2 10 B    Therapeutic Activity (65973)                       Access Code: [email or web address]   URL: [email or web address]   Date: 11/19/2020   Prepared by: Katie Quiet     Exercises   Standing Shoulder Flexion Wall Walk - 10 reps - 1 sets - 10 hold - 2x daily - 7x weekly   Cross Body Arm Reach on Foam Roller - 15 reps - 1x daily - 7x weekly   Supine Chest Stretch on Foam Roll - 60 hold - 1x daily - 7x weekly   Sleeper Stretch - 10 reps - 10 hold - 1x daily - 7x weekly   Standard Plank - 5 sets - 20 hold - 1x daily - 7x weekly   Wall Push Up with Plus - 10 reps - 3 sets - 1x daily - 7x weekly   Sidelying Shoulder ER with Towel and Dumbbell - 15 reps - 2 sets - 5 hold - 3 lbs - 1x daily - 7x weekly   Standing Single Arm Shoulder External Rotation in Abduction with Anchored Resistance - 10 reps - 3 sets - 5 hold - 1x daily - 7x weekly   Prone Lower Trapezius with Legs Straight on Swiss Ball - 10 reps - 3 sets - 1x daily - 7x weekly   Prone Middle Trapezius with Legs Straight and Dumbbells on Swiss Ball - 10 reps - 3 sets - 1x daily - 7x weekly   Standing Shoulder Single Arm PNF D2 Flexion with Resistance - 15 reps - 2 sets - 1x daily - 7x weekly         Therapeutic Exercise and NMR EXR  [x] (89309) Provided verbal/tactile cueing for activities related to strengthening, flexibility, endurance, ROM  for improvements in scapular, scapulothoracic and UE control with self care, reaching, carrying, lifting, house/yardwork, driving/computer work.    [] (25783) Provided verbal/tactile cueing for activities related to improving balance, coordination, kinesthetic sense, posture, motor skill, proprioception  to assist with  scapular, scapulothoracic and UE control with self care, reaching, carrying, lifting, house/yardwork, driving/computer work.     Therapeutic Activities:    [] (18019 or 95527) Provided verbal/tactile cueing for activities related to improving balance, coordination, kinesthetic sense, posture, motor skill, proprioception and motor activation to allow for proper function of scapular, scapulothoracic and UE control with self care, carrying, lifting, driving/computer work. Home Exercise Program:    [x] (50596) Reviewed/Progressed HEP activities related to strengthening, flexibility, endurance, ROM of scapular, scapulothoracic and UE control with self care, reaching, carrying, lifting, house/yardwork, driving/computer work  [] (36675) Reviewed/Progressed HEP activities related to improving balance, coordination, kinesthetic sense, posture, motor skill, proprioception of scapular, scapulothoracic and UE control with self care, reaching, carrying, lifting, house/yardwork, driving/computer work      Manual Treatments:  PROM / STM / Oscillations-Mobs:  G-I, II, III, IV (PA's, Inf., Post.)  [x] (20087) Provided manual therapy to mobilize soft tissue/joints of cervical/CT, scapular GHJ and UE for the purpose of modulating pain, promoting relaxation,  increasing ROM, reducing/eliminating soft tissue swelling/inflammation/restriction, improving soft tissue extensibility and allowing for proper ROM for normal function with self care, reaching, carrying, lifting, house/yardwork, driving/computer work    Modalities:  Vaso GR to L shld x15 min after treatment. Charges:  Timed Code Treatment Minutes: 39'   Total Treatment Minutes: 61'     [facility] time in/time out:     [] EVAL (LOW) 81183   [] EVAL (MOD) 52212   [] EVAL (HIGH) 14594   [] RE-EVAL     [x] FK(25628) x 2  [] IONTO  [x] NMR (61724) x  1  [x] VASO  [x] Manual (72360) x      [] Other:  [x] TA x     [] Mech Traction (21995)  [] ES(attended) (83280)      [] ES (un) (54013):     GOALS:   Patient stated goal: No pain   [x]? Progressing: []? Met: []? Not Met: []? Adjusted     Therapist goals for Patient:   Short Term Goals: To be achieved in: 2 weeks  1.  Independent in HEP and progression per patient tolerance, in order to prevent re-injury. []? Progressing: [x]? Met: []? Not Met: []? Adjusted  2. Patient will have a decrease in pain to facilitate improvement in movement, function, and ADLs as indicated by Functional Deficits. []? Progressing: [x]? Met: []? Not Met: []? Adjusted     Long Term Goals: To be achieved in: 12 weeks  1. Disability index score of 30% or less for the [facility] to assist with reaching prior level of function. [x]? Progressing: []? Met: []? Not Met: []? Adjusted  2. Patient will demonstrate increased AROM to WNL L shld to allow for proper joint functioning as indicated by patients Functional Deficits. [x]? Progressing: []? Met: []? Not Met: []? Adjusted  3. Patient will demonstrate an increase in Strength to 3+/5 or better to allow for proper functional mobility as indicated by patients Functional Deficits. [x]? Progressing: []? Met: []? Not Met: []? Adjusted  4. Patient will return to reaching overhead functional activities without increased symptoms or restriction. [x]? Progressing: []? Met: []? Not Met: []? Adjusted  5. Reaching behind back without pain(patient specific functional goal)    []? Progressing: []? Met: [x]? Not Met: []? Adjusted       Progression Towards Functional goals:  [x] Patient is progressing as expected towards functional goals listed. [] Progression is slowed due to complexities listed. [] Progression has been slowed due to co-morbidities. [] Plan just implemented, too soon to assess goals progression  [] Other:     ASSESSMENT:  Improving activity tolerance; additional CKC performed today without painful sx's- pt requires continued skilled intervention to restore ROM, strength, improve proprioception and functional endurance. Making progress toward goals. Continue- AAROM-> AROM -> PRE as able. Updated HEP.      Overall Progression Towards Functional goals/ Treatment Progress Update:  [x] Patient is progressing as expected towards functional goals listed. [] Progression is slowed due to complexities/Impairments listed. [] Progression has been slowed due to co-morbidities. [] Plan just implemented, too soon to assess goals progression <30days   [] Goals require adjustment due to lack of progress  [] Patient is not progressing as expected and requires additional follow up with physician  [] Other    Prognosis for POC: [x] Good [] Fair  [] Poor      Patient requires continued skilled intervention: [x] Yes  [] No    Treatment/Activity Tolerance:  [x] Patient able to complete treatment  [] Patient limited by fatigue  [] Patient limited by pain    [] Patient limited by other medical complications  [] Other:         Return to Play: (if applicable)   []  Stage 1: Intro to Strength   [x]  Stage 2: Return to Run and Strength   []  Stage 3: Return to Jump and Strength   []  Stage 4: Dynamic Strength and Agility   []  Stage 5: Sport Specific Training     []  Ready to Return to Play, Meets All Above Stages   []  Not Ready for Return to Sports   Comments:                               PLAN: See eval. PROM per protocol for Posterior Labral repair. 1-2x/week. Progress strengthening as tolerated. [x] Continue per plan of care [] Alter current plan (see comments above)  [] Plan of care initiated [] Hold pending MD visit [] Discharge      Electronically signed by:  Jordi Martinez, PT, [id number]    Note: If patient does not return for scheduled/ recommended follow up visits, this note will serve as a discharge from care along with most recent update on progress.

## 2020-11-23 ENCOUNTER — HOSPITAL ENCOUNTER (OUTPATIENT)
Dept: PHYSICAL THERAPY | Age: 25
Setting detail: THERAPIES SERIES
Discharge: HOME | End: 2020-11-23
Payer: COMMERCIAL

## 2020-11-23 PROCEDURE — 97112 NEUROMUSCULAR REEDUCATION: CPT | Performed by: PHYSICAL THERAPIST

## 2020-11-23 PROCEDURE — 97110 THERAPEUTIC EXERCISES: CPT | Performed by: PHYSICAL THERAPIST

## 2020-11-23 PROCEDURE — 97016 VASOPNEUMATIC DEVICE THERAPY: CPT | Performed by: PHYSICAL THERAPIST

## 2020-11-23 NOTE — FLOWSHEET NOTE
[facility], [address]  Phone: [phone]  Fax [phone]        Date:  2020    Patient Name:  Angie Whitman    :  1995  MRN: [id number]  Restrictions/Precautions:  Posterior labral Protocol. Sling  Medical/Treatment Diagnosis Information:  · Diagnosis: M25.312 (ICD-10-CM) - Shoulder instability, left , S43.432A (ICD-10-CM) - Anterior to posterior tear of superior glenoid labrum of left shoulder s/p L shld posterior labral repair on 20  · Treatment Diagnosis: M25.312 (ICD-10-CM) - Shoulder instability, left , N47.380B (ICD-10-CM) - Anterior to posterior tear of superior glenoid labrum of left shoulder s/p L shld posterior labral repair on   Insurance/Certification information:  PT Insurance Information: Aet/60 PT/ no auth  Physician Information:  Referring Practitioner: Dr. Lili Chowdhury  Has the plan of care been signed (Y/N):        []  Yes  [x]  No     Date of Patient follow up with Physician: 11-3-2020      Is this a Progress Report:     []  Yes  [x]  No        If Yes:  Date Range for reporting period:  Beginnin20  Cocflt69/26/20    Progress report will be due (10 Rx or 30 days whichever is less):       Recertification will be due (POC Duration  / 90 days whichever is less): 12 weeks      Visit # Insurance Allowable Auth Required   In-person 13 60PT []  Yes [x]  No    Telehealth   []  Yes []  No    Total            Functional Scale: Quick dash: 36% 27 score    Date assessed:  10/26/20      Therapy Diagnosis/Practice Pattern:4I      Number of Comorbidities:  []0     []1-2    []3    Latex Allergy:  [x]NO      []YES  Preferred Language for Healthcare:   [x]English       []other:      Pain level: 0-3 /10     SUBJECTIVE:  Doing well. Sore in triceps. Improved IR. Progress note NV.      OBJECTIVE:    Observation:     Test measurements:  3 weeks post-op 10/7/20  OBJECTIVE  Test used Initial score 10/26/20   Pain Summary 0-10 7-10 0-3   Functional questionnaire Quick Dash 73% 36%   Functional Testing            ROM PROM Flex 80 150    PROM scap  155-160    PROM Er 0 78   Strength AROM flex  135-140    AROM abd  75 deg       RESTRICTIONS/PRECAUTIONS:   Date of Procedure: 9/16/2020     Pre-Op Diagnosis: SHOULDER INSTABILITY, LEFT     Post-Op Diagnosis: Posterior labral tear, degenerative superior labral tear, low-grade articular sided fraying along the articular surface of the supraspinatus consistent with an i internal impingement syndrome       Procedure(s):  LEFT SHOULDER VIDEO ARTHROSCOPY WITH POSTERIOR LABRAL RECONSTRUCTION.  LIMITED DEBRIDEMENT ROTATOR CUFF.     #1.  Left shoulder arthroscopy with posterior labral repair; 88765  #2.  Left shoulder arthroscopy with a limited debridement including the articular surface of the supraspinatus and superior labrum;       Exercises/Interventions:     Therapeutic Ex (70089) Sets/sec Reps Notes/CUES   Airdyne bike  6-7 mins    T spine threading 3 5 B    Serratus wall slide (w/ OVL)    2 x 15 ea    OVL lat band walk (w/ BVL hip abd)  Clocks  6 laps  8 laps       ER (Supine) H10 10 reps    Corner (s)- 3 way 30\" ea    Cane flexion 10\" 10 reps    TB ER walkaway (RTB)- 90- 90 H5 10 ea    TB row/ ext BTB  2 x 15 ea    SL ER 3# H5/ 2  15    SL sleeper (s) 30\" 5    Body blade: flex 90, ER 0, ABD 90 30\" 3 ea    Split stance SA row 20# 2 x 15    Shoulder ext (bar) 30# 2 x 15    Bent over lat pull (rope) 30# 2 x 15    IR strap (s)- horizontal to midback  +into IR H10 5 ea    Pushup on wall 10  3D    No money  BW/ 2  15 reps    Manual Intervention (29267)                                     NMR re-education (32106)   CUES NEEDED   Ball clocks on wall 10 laps     Prone plank 25\" 5    SB \"I\", \"Y\", \"T\" 2 10 ea BW    Scap clocks 12 reps     Hug a Tree 3 10 GTB         Quadruped w/ CL UE lift/ shoulder taps  Quadruped Y balance H5 20  10 reps B    BOSU 3D scap protract/ retract  15 ea    Bird dog 2 10 B    Therapeutic Activity (87189)         Up up down down on 4\" box 2 10 ea            Access Code: [email or web address]   URL: [email or web address]   Date: 11/19/2020   Prepared by: Migel Santos     Exercises   Standing Shoulder Flexion Wall Walk - 10 reps - 1 sets - 10 hold - 2x daily - 7x weekly   Cross Body Arm Reach on Foam Roller - 15 reps - 1x daily - 7x weekly   Supine Chest Stretch on Foam Roll - 60 hold - 1x daily - 7x weekly   Sleeper Stretch - 10 reps - 10 hold - 1x daily - 7x weekly   Standard Plank - 5 sets - 20 hold - 1x daily - 7x weekly   Wall Push Up with Plus - 10 reps - 3 sets - 1x daily - 7x weekly   Sidelying Shoulder ER with Towel and Dumbbell - 15 reps - 2 sets - 5 hold - 3 lbs - 1x daily - 7x weekly   Standing Single Arm Shoulder External Rotation in Abduction with Anchored Resistance - 10 reps - 3 sets - 5 hold - 1x daily - 7x weekly   Prone Lower Trapezius with Legs Straight on Swiss Ball - 10 reps - 3 sets - 1x daily - 7x weekly   Prone Middle Trapezius with Legs Straight and Dumbbells on Swiss Ball - 10 reps - 3 sets - 1x daily - 7x weekly   Standing Shoulder Single Arm PNF D2 Flexion with Resistance - 15 reps - 2 sets - 1x daily - 7x weekly         Therapeutic Exercise and NMR EXR  [x] (25483) Provided verbal/tactile cueing for activities related to strengthening, flexibility, endurance, ROM  for improvements in scapular, scapulothoracic and UE control with self care, reaching, carrying, lifting, house/yardwork, driving/computer work.    [] (29825) Provided verbal/tactile cueing for activities related to improving balance, coordination, kinesthetic sense, posture, motor skill, proprioception  to assist with  scapular, scapulothoracic and UE control with self care, reaching, carrying, lifting, house/yardwork, driving/computer work.     Therapeutic Activities:    [] (45810 or ) Provided verbal/tactile cueing for activities related to improving balance, coordination, kinesthetic sense, posture, motor skill, proprioception and motor activation to allow for proper function of scapular, scapulothoracic and UE control with self care, carrying, lifting, driving/computer work. Home Exercise Program:    [x] (98322) Reviewed/Progressed HEP activities related to strengthening, flexibility, endurance, ROM of scapular, scapulothoracic and UE control with self care, reaching, carrying, lifting, house/yardwork, driving/computer work  [] (19951) Reviewed/Progressed HEP activities related to improving balance, coordination, kinesthetic sense, posture, motor skill, proprioception of scapular, scapulothoracic and UE control with self care, reaching, carrying, lifting, house/yardwork, driving/computer work      Manual Treatments:  PROM / STM / Oscillations-Mobs:  G-I, II, III, IV (PA's, Inf., Post.)  [x] (48102) Provided manual therapy to mobilize soft tissue/joints of cervical/CT, scapular GHJ and UE for the purpose of modulating pain, promoting relaxation,  increasing ROM, reducing/eliminating soft tissue swelling/inflammation/restriction, improving soft tissue extensibility and allowing for proper ROM for normal function with self care, reaching, carrying, lifting, house/yardwork, driving/computer work    Modalities:  Vaso GR to L shld x15 min after treatment. Charges:  Timed Code Treatment Minutes: 39'   Total Treatment Minutes: 61'     [facility] time in/time out:     [] EVAL (LOW) 10637   [] EVAL (MOD) 16363   [] EVAL (HIGH) 80792   [] RE-EVAL     [x] AO(60799) x 2  [] IONTO  [x] NMR (97708) x  1  [x] VASO  [x] Manual (36885) x      [] Other:  [x] TA x     [] Mech Traction (68981)  [] ES(attended) (46330)      [] ES (un) (04696):     GOALS:   Patient stated goal: No pain   [x]? Progressing: []? Met: []? Not Met: []? Adjusted     Therapist goals for Patient:   Short Term Goals: To be achieved in: 2 weeks  1.  Independent in HEP and progression per patient tolerance, in order to prevent re-injury. []? Progressing: [x]? Met: []? Not Met: []? Adjusted  2. Patient will have a decrease in pain to facilitate improvement in movement, function, and ADLs as indicated by Functional Deficits. []? Progressing: [x]? Met: []? Not Met: []? Adjusted     Long Term Goals: To be achieved in: 12 weeks  1. Disability index score of 30% or less for the [facility] to assist with reaching prior level of function. [x]? Progressing: []? Met: []? Not Met: []? Adjusted  2. Patient will demonstrate increased AROM to WNL L shld to allow for proper joint functioning as indicated by patients Functional Deficits. [x]? Progressing: []? Met: []? Not Met: []? Adjusted  3. Patient will demonstrate an increase in Strength to 3+/5 or better to allow for proper functional mobility as indicated by patients Functional Deficits. [x]? Progressing: []? Met: []? Not Met: []? Adjusted  4. Patient will return to reaching overhead functional activities without increased symptoms or restriction. [x]? Progressing: []? Met: []? Not Met: []? Adjusted  5. Reaching behind back without pain(patient specific functional goal)    []? Progressing: []? Met: [x]? Not Met: []? Adjusted       Progression Towards Functional goals:  [x] Patient is progressing as expected towards functional goals listed. [] Progression is slowed due to complexities listed. [] Progression has been slowed due to co-morbidities. [] Plan just implemented, too soon to assess goals progression  [] Other:     ASSESSMENT:  Improving activity tolerance; additional CKC performed today without painful sx's- pt requires continued skilled intervention to restore ROM, strength, improve proprioception and functional endurance. Making progress toward goals. Continue- AAROM-> AROM -> PRE as able. Updated HEP.      Overall Progression Towards Functional goals/ Treatment Progress Update:  [x] Patient is progressing as expected towards functional goals listed. [] Progression is slowed due to complexities/Impairments listed. [] Progression has been slowed due to co-morbidities. [] Plan just implemented, too soon to assess goals progression <30days   [] Goals require adjustment due to lack of progress  [] Patient is not progressing as expected and requires additional follow up with physician  [] Other    Prognosis for POC: [x] Good [] Fair  [] Poor      Patient requires continued skilled intervention: [x] Yes  [] No    Treatment/Activity Tolerance:  [x] Patient able to complete treatment  [] Patient limited by fatigue  [] Patient limited by pain    [] Patient limited by other medical complications  [] Other:         Return to Play: (if applicable)   []  Stage 1: Intro to Strength   [x]  Stage 2: Return to Run and Strength   []  Stage 3: Return to Jump and Strength   []  Stage 4: Dynamic Strength and Agility   []  Stage 5: Sport Specific Training     []  Ready to Return to Play, Meets All Above Stages   []  Not Ready for Return to Sports   Comments:                               PLAN: See eval. PROM per protocol for Posterior Labral repair. 1-2x/week. Progress strengthening as tolerated. [x] Continue per plan of care [] Alter current plan (see comments above)  [] Plan of care initiated [] Hold pending MD visit [] Discharge      Electronically signed by:  Nehal Perry, PT, [id number]    Note: If patient does not return for scheduled/ recommended follow up visits, this note will serve as a discharge from care along with most recent update on progress.

## 2020-11-30 ENCOUNTER — HOSPITAL ENCOUNTER (OUTPATIENT)
Dept: PHYSICAL THERAPY | Age: 25
Setting detail: THERAPIES SERIES
Discharge: HOME | End: 2020-11-30
Payer: COMMERCIAL

## 2020-11-30 PROCEDURE — 97112 NEUROMUSCULAR REEDUCATION: CPT | Performed by: PHYSICAL THERAPIST

## 2020-11-30 PROCEDURE — 97110 THERAPEUTIC EXERCISES: CPT | Performed by: PHYSICAL THERAPIST

## 2020-11-30 PROCEDURE — 97016 VASOPNEUMATIC DEVICE THERAPY: CPT | Performed by: PHYSICAL THERAPIST

## 2020-11-30 NOTE — FLOWSHEET NOTE
[facility], [address]  Phone: [phone]  Fax [phone]        Date:  2020    Patient Name:  Funmilayo Arboleda    :  1995  MRN: [id number]  Restrictions/Precautions:  Posterior labral Protocol. Sling  Medical/Treatment Diagnosis Information:  · Diagnosis: M25.312 (ICD-10-CM) - Shoulder instability, left , S43.432A (ICD-10-CM) - Anterior to posterior tear of superior glenoid labrum of left shoulder s/p L shld posterior labral repair on 20  · Treatment Diagnosis: M25.312 (ICD-10-CM) - Shoulder instability, left , I72.149P (ICD-10-CM) - Anterior to posterior tear of superior glenoid labrum of left shoulder s/p L shld posterior labral repair on 8/71/15  Insurance/Certification information:  PT Insurance Information: [employer]/60 PT/ no auth  Physician Information:  Referring Practitioner: Dr. Umer Nassar  Has the plan of care been signed (Y/N):        []  Yes  [x]  No     Date of Patient follow up with Physician: 11-3-2020      Is this a Progress Report:     [x]  Yes  []  No        If Yes:  Date Range for reporting period:  Beginnin2020  Ending 2020    Progress report will be due (10 Rx or 30 days whichever is less):       Recertification will be due (POC Duration  / 90 days whichever is less): 12 weeks      Visit # Insurance Allowable Auth Required   In-person 13 60PT []  Yes [x]  No    Telehealth   []  Yes []  No    Total            Functional Scale: Quick dash: 36% 27 score    Date assessed:  10/26/20      Therapy Diagnosis/Practice Pattern:4I      Number of Comorbidities:  []0     []1-2    []3    Latex Allergy:  [x]NO      []YES  Preferred Language for Healthcare:   [x]English       []other:         SUBJECTIVE:  Pt perceives 70-80% improvement in overall condition.      OBJECTIVE:    Observation:     Test measurements:    OBJECTIVE  Test used Initial score 10/26/20 2020 Pain Summary 0-10 7-10 0-3 0-2   Functional questionnaire Quick Dash 73% 36% 7%   Functional Testing              ROM PROM Flex 80 150 WFL    PROM scap  155-160     PROM Er  AROM ER 0 78   T3    IR   T11  T 12 (R)   Strength Flex/ Scaption   5-    ER   4       RESTRICTIONS/PRECAUTIONS:   Date of Procedure: 9/16/2020     Pre-Op Diagnosis: SHOULDER INSTABILITY, LEFT     Post-Op Diagnosis: Posterior labral tear, degenerative superior labral tear, low-grade articular sided fraying along the articular surface of the supraspinatus consistent with an i internal impingement syndrome       Procedure(s):  LEFT SHOULDER VIDEO ARTHROSCOPY WITH POSTERIOR LABRAL RECONSTRUCTION.  LIMITED DEBRIDEMENT ROTATOR CUFF.     #1.  Left shoulder arthroscopy with posterior labral repair; 78964  #2.  Left shoulder arthroscopy with a limited debridement including the articular surface of the supraspinatus and superior labrum;       Exercises/Interventions:     Therapeutic Ex (20870) Sets/sec Reps Notes/CUES   Airdyne bike  6-7 mins    T spine threading 3 5 B    Serratus wall slide (w/ OVL)    2 x 15 ea    OVL lat band walk (w/ BVL hip abd)  Clocks  6 laps  8 laps       ER (Supine) H10 10 reps    Corner (s)- 3 way 30\" ea    Cane flexion 10\" 10 reps    TB ER/IR (GTB)- 90- 90 2 12 ea    TB row/ ext BTB  2 x 15 ea    SL ER 3# H5/ 2  15    SL sleeper (s) 30\" 5    Body blade: flex 90, ER 0, ABD 90 30\" 3 ea    Split stance SA row 20# 2 x 15    Shoulder ext (bar) 30# 2 x 15    Bent over lat pull (rope) 30# 2 x 15    IR strap (s)- horizontal to midback  +into IR H10 5 ea    Pushup on wall 10  3D    No money  BW/ 2  15 reps    Manual Intervention (95465)                                     NMR re-education (06663)   CUES NEEDED   Ball clocks on wall 10 laps     Prone plank  With scap protract/ retract 30\"  2 3  10    SB \"I\", \"Y\", \"T\" 2 10 ea BW    Prone H ABD + ER 1# 3 12    Scap clocks 12 reps     Hug a Tree 3 10 GTB      D2 flex; D1 flex (GTB) 3 12 ea    Plank w/ CL UE lift/ shoulder taps  Quadruped Y balance 3 8 ea  10 reps B    BOSU 3D scap protract/ retract  15 ea    Bird dog 2 10 B    Therapeutic Activity (46283)         Up up down down on 4\" box 2 10 ea            Access Code: [email or web address]   URL: [email or web address]   Date: 11/19/2020   Prepared by: Ynes Durbin     Exercises   Standing Shoulder Flexion Wall Walk - 10 reps - 1 sets - 10 hold - 2x daily - 7x weekly   Cross Body Arm Reach on Foam Roller - 15 reps - 1x daily - 7x weekly   Supine Chest Stretch on Foam Roll - 60 hold - 1x daily - 7x weekly   Sleeper Stretch - 10 reps - 10 hold - 1x daily - 7x weekly   Standard Plank - 5 sets - 20 hold - 1x daily - 7x weekly   Wall Push Up with Plus - 10 reps - 3 sets - 1x daily - 7x weekly   Sidelying Shoulder ER with Towel and Dumbbell - 15 reps - 2 sets - 5 hold - 3 lbs - 1x daily - 7x weekly   Standing Single Arm Shoulder External Rotation in Abduction with Anchored Resistance - 10 reps - 3 sets - 5 hold - 1x daily - 7x weekly   Prone Lower Trapezius with Legs Straight on Swiss Ball - 10 reps - 3 sets - 1x daily - 7x weekly   Prone Middle Trapezius with Legs Straight and Dumbbells on Swiss Ball - 10 reps - 3 sets - 1x daily - 7x weekly   Standing Shoulder Single Arm PNF D2 Flexion with Resistance - 15 reps - 2 sets - 1x daily - 7x weekly         Therapeutic Exercise and NMR EXR  [x] (16769) Provided verbal/tactile cueing for activities related to strengthening, flexibility, endurance, ROM  for improvements in scapular, scapulothoracic and UE control with self care, reaching, carrying, lifting, house/yardwork, driving/computer work.    [] (69290) Provided verbal/tactile cueing for activities related to improving balance, coordination, kinesthetic sense, posture, motor skill, proprioception  to assist with  scapular, scapulothoracic and UE control with self care, reaching, carrying, lifting, house/yardwork, driving/computer work.     Therapeutic Activities:    [] (37612 or 02441) Provided verbal/tactile cueing for activities related to improving balance, coordination, kinesthetic sense, posture, motor skill, proprioception and motor activation to allow for proper function of scapular, scapulothoracic and UE control with self care, carrying, lifting, driving/computer work. Home Exercise Program:    [x] (88154) Reviewed/Progressed HEP activities related to strengthening, flexibility, endurance, ROM of scapular, scapulothoracic and UE control with self care, reaching, carrying, lifting, house/yardwork, driving/computer work  [] (45908) Reviewed/Progressed HEP activities related to improving balance, coordination, kinesthetic sense, posture, motor skill, proprioception of scapular, scapulothoracic and UE control with self care, reaching, carrying, lifting, house/yardwork, driving/computer work      Manual Treatments:  PROM / STM / Oscillations-Mobs:  G-I, II, III, IV (PA's, Inf., Post.)  [x] (33061) Provided manual therapy to mobilize soft tissue/joints of cervical/CT, scapular GHJ and UE for the purpose of modulating pain, promoting relaxation,  increasing ROM, reducing/eliminating soft tissue swelling/inflammation/restriction, improving soft tissue extensibility and allowing for proper ROM for normal function with self care, reaching, carrying, lifting, house/yardwork, driving/computer work    Modalities:  Vaso GR to L shld x15 min after treatment. Charges:  Timed Code Treatment Minutes: 39'   Total Treatment Minutes: 61'     [facility] time in/time out:     [] EVAL (LOW) 11818   [] EVAL (MOD) 82035   [] EVAL (HIGH) 66526   [] RE-EVAL     [x] OK(35957) x 2  [] IONTO  [x] NMR (03163) x  1  [x] VASO  [x] Manual (79869) x      [] Other:  [x] TA x     [] Mech Traction (00714)  [] ES(attended) (85795)      [] ES (un) (26862):     GOALS:   Patient stated goal: No pain   [x]? Progressing: []? Met: []? Not Met: []?  Adjusted     Therapist goals for Patient:   Short Term

## 2020-12-03 ENCOUNTER — HOSPITAL ENCOUNTER (OUTPATIENT)
Dept: PHYSICAL THERAPY | Age: 25
Setting detail: THERAPIES SERIES
Discharge: HOME | End: 2020-12-03
Payer: COMMERCIAL

## 2020-12-03 PROCEDURE — 97110 THERAPEUTIC EXERCISES: CPT | Performed by: PHYSICAL THERAPIST

## 2020-12-03 PROCEDURE — 97112 NEUROMUSCULAR REEDUCATION: CPT | Performed by: PHYSICAL THERAPIST

## 2020-12-03 PROCEDURE — 97016 VASOPNEUMATIC DEVICE THERAPY: CPT | Performed by: PHYSICAL THERAPIST

## 2020-12-03 NOTE — FLOWSHEET NOTE
[facility], [address] [place]  Phone: [phone]  Fax [phone]        Date:  12/3/2020    Patient Name:  Wale Dhillon    :  1995  MRN: [id number]  Restrictions/Precautions:  Posterior labral Protocol. Sling  Medical/Treatment Diagnosis Information:  · Diagnosis: M25.312 (ICD-10-CM) - Shoulder instability, left , S43.432A (ICD-10-CM) - Anterior to posterior tear of superior glenoid labrum of left shoulder s/p L shld posterior labral repair on 20  · Treatment Diagnosis: M25.312 (ICD-10-CM) - Shoulder instability, left , E91.924F (ICD-10-CM) - Anterior to posterior tear of superior glenoid labrum of left shoulder s/p L shld posterior labral repair on 14  Insurance/Certification information:  PT Insurance Information: [employer]/60 PT/ no auth  Physician Information:  Referring Practitioner: Dr. Martina Coleman  Has the plan of care been signed (Y/N):        []  Yes  [x]  No     Date of Patient follow up with Physician: 11-3-2020      Is this a Progress Report:     [x]  Yes  []  No        If Yes:  Date Range for reporting period:  Beginnin2020  Ending 2020    Progress report will be due (10 Rx or 30 days whichever is less):       Recertification will be due (POC Duration  / 90 days whichever is less): 12 weeks      Visit # Insurance Allowable Auth Required   In-person 14 60PT []  Yes [x]  No    Telehealth   []  Yes []  No    Total            Functional Scale: Quick dash: 36% 27 score    Date assessed:  10/26/20      Therapy Diagnosis/Practice Pattern:4I      Number of Comorbidities:  []0     []1-2    []3    Latex Allergy:  [x]NO      []YES  Preferred Language for Healthcare:   [x]English       []other:         SUBJECTIVE:  Pt perceives 70-80% improvement in overall condition.      OBJECTIVE:    Observation:     Test measurements:    OBJECTIVE  Test used Initial score 10/26/20 2020   Pain Summary 0-10 7-10 0-3 0-2   Functional questionnaire Quick Dash 73% 36% 7%   Functional Testing              ROM PROM Flex 80 150 WFL    PROM scap  155-160     PROM Er  AROM ER 0 78   T3    IR   T11  T 12 (R)   Strength Flex/ Scaption   5-    ER   4       RESTRICTIONS/PRECAUTIONS:   Date of Procedure: 9/16/2020     Pre-Op Diagnosis: SHOULDER INSTABILITY, LEFT     Post-Op Diagnosis: Posterior labral tear, degenerative superior labral tear, low-grade articular sided fraying along the articular surface of the supraspinatus consistent with an i internal impingement syndrome       Procedure(s):  LEFT SHOULDER VIDEO ARTHROSCOPY WITH POSTERIOR LABRAL RECONSTRUCTION.  LIMITED DEBRIDEMENT ROTATOR CUFF.     #1.  Left shoulder arthroscopy with posterior labral repair; 61132  #2.  Left shoulder arthroscopy with a limited debridement including the articular surface of the supraspinatus and superior labrum;       Exercises/Interventions:     Therapeutic Ex (51652) Sets/sec Reps Notes/CUES   Airdyne bike  6-7 mins    T spine threading 3 5 B    Serratus wall slide (w/ OVL)    2 x 15 ea    OVL lat band walk (w/ BVL hip abd)  Clocks  6 laps  8 laps       ER (Supine) H10 10 reps    Corner (s)- 3 way 30\" ea    Cane flexion 10\" 10 reps    TB ER/IR (GTB)- 90- 90 2 12 ea    TB row/ ext BTB  2 x 15 ea    SL ER 3# H5/ 2  15    SL sleeper (s) 30\" 5    Body blade: flex 90, ER 0, ABD 90 30\" 3 ea    Split stance SA row 20# 2 x 15    Shoulder ext (bar) 30# 2 x 15    Bent over lat pull (rope) 30# 2 x 15    IR strap (s)- horizontal to midback  +into IR H10 5 ea    Pushup on wall 10  3D    No money  BW/ 2  15 reps    Manual Intervention (07359)                                     NMR re-education (03568)   CUES NEEDED   Ball clocks on wall 10 laps     Prone plank  With scap protract/ retract 30\"  2 3  10    SB \"I\", \"Y\", \"T\" 2 10 ea BW    Prone H ABD + ER 1# 3 12    SA row with lunge -> march  Bent over lat pull  Straight UE ext 25#  30#->40#  15# 2 x 12 B  15,12,10  2 x 12 B    Hug a Tree 3 10 GTB      D2 flex; D1 flex (GTB) 3 12 ea    Plank w/ CL UE lift/ shoulder taps  Quadruped Y balance 3 8 ea  10 reps B    BOSU 3D scap protract/ retract  15 ea    Bird dog 2 10 B    Therapeutic Activity (65618)         Up up down down on 4\" box 2 10 ea    Chest pass  Overhead throw  SA pitch/ catch  R->L pitch/ catch  2 x 15  2 x 15  3 x 10  3 x 10 Tramp     Access Code: [email or web address]   URL: [email or web address]/   Date: 11/19/2020   Prepared by: Manuel Davies     Exercises   Standing Shoulder Flexion Wall Walk - 10 reps - 1 sets - 10 hold - 2x daily - 7x weekly   Cross Body Arm Reach on Foam Roller - 15 reps - 1x daily - 7x weekly   Supine Chest Stretch on Foam Roll - 60 hold - 1x daily - 7x weekly   Sleeper Stretch - 10 reps - 10 hold - 1x daily - 7x weekly   Standard Plank - 5 sets - 20 hold - 1x daily - 7x weekly   Wall Push Up with Plus - 10 reps - 3 sets - 1x daily - 7x weekly   Sidelying Shoulder ER with Towel and Dumbbell - 15 reps - 2 sets - 5 hold - 3 lbs - 1x daily - 7x weekly   Standing Single Arm Shoulder External Rotation in Abduction with Anchored Resistance - 10 reps - 3 sets - 5 hold - 1x daily - 7x weekly   Prone Lower Trapezius with Legs Straight on Swiss Ball - 10 reps - 3 sets - 1x daily - 7x weekly   Prone Middle Trapezius with Legs Straight and Dumbbells on Swiss Ball - 10 reps - 3 sets - 1x daily - 7x weekly   Standing Shoulder Single Arm PNF D2 Flexion with Resistance - 15 reps - 2 sets - 1x daily - 7x weekly         Therapeutic Exercise and NMR EXR  [x] (47042) Provided verbal/tactile cueing for activities related to strengthening, flexibility, endurance, ROM  for improvements in scapular, scapulothoracic and UE control with self care, reaching, carrying, lifting, house/yardwork, driving/computer work.    [] (06324) Provided verbal/tactile cueing for activities related to improving balance, coordination, kinesthetic sense, posture, motor skill, proprioception  to assist with  scapular, scapulothoracic and UE control with self care, reaching, carrying, lifting, house/yardwork, driving/computer work. Therapeutic Activities:    [] (08092 or 80570) Provided verbal/tactile cueing for activities related to improving balance, coordination, kinesthetic sense, posture, motor skill, proprioception and motor activation to allow for proper function of scapular, scapulothoracic and UE control with self care, carrying, lifting, driving/computer work. Home Exercise Program:    [x] (78441) Reviewed/Progressed HEP activities related to strengthening, flexibility, endurance, ROM of scapular, scapulothoracic and UE control with self care, reaching, carrying, lifting, house/yardwork, driving/computer work  [] (64386) Reviewed/Progressed HEP activities related to improving balance, coordination, kinesthetic sense, posture, motor skill, proprioception of scapular, scapulothoracic and UE control with self care, reaching, carrying, lifting, house/yardwork, driving/computer work      Manual Treatments:  PROM / STM / Oscillations-Mobs:  G-I, II, III, IV (PA's, Inf., Post.)  [x] (63276) Provided manual therapy to mobilize soft tissue/joints of cervical/CT, scapular GHJ and UE for the purpose of modulating pain, promoting relaxation,  increasing ROM, reducing/eliminating soft tissue swelling/inflammation/restriction, improving soft tissue extensibility and allowing for proper ROM for normal function with self care, reaching, carrying, lifting, house/yardwork, driving/computer work    Modalities:  Vaso GR to L shld x15 min after treatment.   Charges:  Timed Code Treatment Minutes: 39'   Total Treatment Minutes: 61'     [facility] time in/time out:     [] EVAL (LOW) 41058   [] EVAL (MOD) 40308   [] EVAL (HIGH) 32617   [] RE-EVAL     [x] ZT(59609) x 2  [] IONTO  [x] NMR (55404) x  1  [x] VASO  [x] Manual (93095) x      [] Other:  [x] TA x     [] Mech Traction (38114)  [] ES(attended) (81910)      [] ES (un) (51136):     GOALS:   Patient stated goal: No pain   [x]? Progressing: []? Met: []? Not Met: []? Adjusted     Therapist goals for Patient:   Short Term Goals: To be achieved in: 2 weeks  1. Independent in HEP and progression per patient tolerance, in order to prevent re-injury. []? Progressing: [x]? Met: []? Not Met: []? Adjusted  2. Patient will have a decrease in pain to facilitate improvement in movement, function, and ADLs as indicated by Functional Deficits. []? Progressing: [x]? Met: []? Not Met: []? Adjusted     Long Term Goals: To be achieved in: 12 weeks  1. Disability index score of 30% or less for the [facility] to assist with reaching prior level of function. []? Progressing: [x]? Met: []? Not Met: [x]? Adjusted 5% Q DASH  2. Patient will demonstrate increased AROM to WNL L shld to allow for proper joint functioning as indicated by patients Functional Deficits. []? Progressing: [x]? Met: []? Not Met: []? Adjusted  3. Patient will demonstrate an increase in Strength to 3+/5 or better to allow for proper functional mobility as indicated by patients Functional Deficits. []? Progressing: [x]? Met: []? Not Met: [x]? Adjusted 5/5 RTC strength  4. Patient will return to reaching overhead functional activities without increased symptoms or restriction. []? Progressing: [x]? Met: []? Not Met: []? Adjusted  5. Reaching behind back without pain(patient specific functional goal)    []? Progressing: [x]? Met: []? Not Met: []? Adjusted   6. Pt will be able to perform plyometric activity with shoulder without sx's or limitations. []? Progressing: [x]? Met: []? Not Met: []? Adjusted   Progression Towards Functional goals:  [x] Patient is progressing as expected towards functional goals listed. [] Progression is slowed due to complexities listed. [] Progression has been slowed due to co-morbidities.   [] Plan just implemented, too soon to assess goals progression  [] Other: ASSESSMENT:  Improving activity tolerance; additional CKC performed today without painful sx's- pt requires continued skilled intervention to restore strength, improve proprioception and build functional endurance. Goals updated. Continue to progress per tolerance. Overall Progression Towards Functional goals/ Treatment Progress Update:  [x] Patient is progressing as expected towards functional goals listed. [] Progression is slowed due to complexities/Impairments listed. [] Progression has been slowed due to co-morbidities. [] Plan just implemented, too soon to assess goals progression <30days   [] Goals require adjustment due to lack of progress  [] Patient is not progressing as expected and requires additional follow up with physician  [] Other    Prognosis for POC: [x] Good [] Fair  [] Poor      Patient requires continued skilled intervention: [x] Yes  [] No    Treatment/Activity Tolerance:  [x] Patient able to complete treatment  [] Patient limited by fatigue  [] Patient limited by pain    [] Patient limited by other medical complications  [] Other:         Return to Play: (if applicable)   []  Stage 1: Intro to Strength   [x]  Stage 2: Return to Run and Strength   []  Stage 3: Return to Jump and Strength   []  Stage 4: Dynamic Strength and Agility   []  Stage 5: Sport Specific Training     []  Ready to Return to Play, Meets All Above Stages   []  Not Ready for Return to Sports   Comments:                               PLAN: See eval. PROM per protocol for Posterior Labral repair. 1-2x/week. Progress strengthening as tolerated. [x] Continue per plan of care [] Alter current plan (see comments above)  [] Plan of care initiated [] Hold pending MD visit [] Discharge      Electronically signed by:  Morenita Hoover, PT, [id number]    Note: If patient does not return for scheduled/ recommended follow up visits, this note will serve as a discharge from care along with most recent update on progress.

## 2020-12-07 ENCOUNTER — HOSPITAL ENCOUNTER (OUTPATIENT)
Dept: PHYSICAL THERAPY | Age: 25
Setting detail: THERAPIES SERIES
Discharge: HOME | End: 2020-12-07
Payer: COMMERCIAL

## 2020-12-07 PROCEDURE — 97110 THERAPEUTIC EXERCISES: CPT | Performed by: PHYSICAL THERAPIST

## 2020-12-07 PROCEDURE — 97530 THERAPEUTIC ACTIVITIES: CPT | Performed by: PHYSICAL THERAPIST

## 2020-12-07 PROCEDURE — 97112 NEUROMUSCULAR REEDUCATION: CPT | Performed by: PHYSICAL THERAPIST

## 2020-12-07 NOTE — FLOWSHEET NOTE
[facility], [address] [place]  Phone: [phone]  Fax [phone]        Date:  2020    Patient Name:  Leighton Rankin    :  1995  MRN: [id number]  Restrictions/Precautions:  Posterior labral Protocol. Sling  Medical/Treatment Diagnosis Information:  · Diagnosis: M25.312 (ICD-10-CM) - Shoulder instability, left , S43.432A (ICD-10-CM) - Anterior to posterior tear of superior glenoid labrum of left shoulder s/p L shld posterior labral repair on 20  · Treatment Diagnosis: M25.312 (ICD-10-CM) - Shoulder instability, left , W01.433M (ICD-10-CM) - Anterior to posterior tear of superior glenoid labrum of left shoulder s/p L shld posterior labral repair on   Insurance/Certification information:  PT Insurance Information: [employer]et/60 PT/ no auth  Physician Information:  Referring Practitioner: Dr. Keagan Sterling  Has the plan of care been signed (Y/N):        []  Yes  [x]  No     Date of Patient follow up with Physician: 11-3-2020      Is this a Progress Report:     [x]  Yes  []  No        If Yes:  Date Range for reporting period:  Beginnin2020  Ending 2020    Progress report will be due (10 Rx or 30 days whichever is less):       Recertification will be due (POC Duration  / 90 days whichever is less): 12 weeks      Visit # Insurance Allowable Auth Required   In-person 14 60PT []  Yes [x]  No    Telehealth   []  Yes []  No    Total            Functional Scale: Quick dash: 36% 27 score    Date assessed:  10/26/20      Therapy Diagnosis/Practice Pattern:4I      Number of Comorbidities:  []0     []1-2    []3    Latex Allergy:  [x]NO      []YES  Preferred Language for Healthcare:   [x]English       []other:         SUBJECTIVE:  Pt doing well. D/C NV- do Y balance and taps measurements NV.       OBJECTIVE:    Observation:     Test measurements:    OBJECTIVE  Test used Initial score 10/26/20 11/30/2020   Pain Summary 0-10 7-10 0-3 0-2   Functional questionnaire Quick Dash 73% 36% 7%   Functional Testing              ROM PROM Flex 80 150 WFL    PROM scap  155-160     PROM Er  AROM ER 0 78   T3    IR   T11  T 12 (R)   Strength Flex/ Scaption   5-    ER   4       RESTRICTIONS/PRECAUTIONS:   Date of Procedure: 9/16/2020     Pre-Op Diagnosis: SHOULDER INSTABILITY, LEFT     Post-Op Diagnosis: Posterior labral tear, degenerative superior labral tear, low-grade articular sided fraying along the articular surface of the supraspinatus consistent with an i internal impingement syndrome       Procedure(s):  LEFT SHOULDER VIDEO ARTHROSCOPY WITH POSTERIOR LABRAL RECONSTRUCTION.  LIMITED DEBRIDEMENT ROTATOR CUFF.     #1.  Left shoulder arthroscopy with posterior labral repair; 65189  #2.  Left shoulder arthroscopy with a limited debridement including the articular surface of the supraspinatus and superior labrum;       Exercises/Interventions:     Therapeutic Ex (45501) Sets/sec Reps Notes/CUES   Airdyne bike  6-7 mins    T spine threading 3 5 B    Serratus wall slide (w/ OVL)    2 x 15 ea    OVL lat band walk (w/ BVL hip abd)  Clocks  6 laps  8 laps       TB AROM ER  GTB 3 x 10 At 90-90   Corner (s)- 3 way 30\" ea    Cane flexion 10\" 10 reps    TB ER/IR (GTB)- 90- 90 2 12 ea    TB row/ ext BTB  2 x 15 ea    SL ER 3# H5/ 2  15    SL sleeper (s) 30\" 5    Body blade: flex 90, ER 0, ABD 90 30\" 3 ea    Split stance SA row 20# 2 x 15    Shoulder ext (bar) 30# 2 x 15    Bent over lat pull (rope) 30# 2 x 15    IR strap (s)- horizontal to midback  +into IR H10 5 ea    Pushup on wall 10  3D    No money  BW/ 2  15 reps    Manual Intervention (27115)                                     NMR re-education (98461)   CUES NEEDED   Ball clocks on wall 10 laps     Prone plank  With scap protract/ retract 30\"  2 3  10    SB \"I\", \"Y\", \"T\" 2 10 ea BW    Prone H ABD + ER 1# 3 12    SA row with lunge -> march  Bent over lat pull  Straight UE ext 25#  30#->40#  15# 2 x 12 B  15,12,10  2 x 12 B    Hug a Tree 3 10 GTB      D2 flex; D1 flex (BTB) 3 10     Plank w/ CL UE shoulder taps outside arm  Mod plank Y balance 2 12 ea    2 x 6 reps B    BOSU 3D scap protract/ retract  15 ea    Bird dog 2 10 B    Therapeutic Activity (13667)         Up up down down on 4\" box 2 10 ea    Chest pass  Overhead throw  SA pitch/ catch  R->L pitch/ catch  Pitch to catch in stance (baseball)  2 x 15, 1 x 15  2 x 15, 1 x 15  3 x 12  3 x 12  3 x 10 Tramp     Access Code: [email or web address]   URL: [email or web address]   Date: 11/19/2020   Prepared by: Ynes Durbin     Exercises   Standing Shoulder Flexion Wall Walk - 10 reps - 1 sets - 10 hold - 2x daily - 7x weekly   Cross Body Arm Reach on Foam Roller - 15 reps - 1x daily - 7x weekly   Supine Chest Stretch on Foam Roll - 60 hold - 1x daily - 7x weekly   Sleeper Stretch - 10 reps - 10 hold - 1x daily - 7x weekly   Standard Plank - 5 sets - 20 hold - 1x daily - 7x weekly   Wall Push Up with Plus - 10 reps - 3 sets - 1x daily - 7x weekly   Sidelying Shoulder ER with Towel and Dumbbell - 15 reps - 2 sets - 5 hold - 3 lbs - 1x daily - 7x weekly   Standing Single Arm Shoulder External Rotation in Abduction with Anchored Resistance - 10 reps - 3 sets - 5 hold - 1x daily - 7x weekly   Prone Lower Trapezius with Legs Straight on Swiss Ball - 10 reps - 3 sets - 1x daily - 7x weekly   Prone Middle Trapezius with Legs Straight and Dumbbells on Swiss Ball - 10 reps - 3 sets - 1x daily - 7x weekly   Standing Shoulder Single Arm PNF D2 Flexion with Resistance - 15 reps - 2 sets - 1x daily - 7x weekly         Therapeutic Exercise and NMR EXR  [x] (26028) Provided verbal/tactile cueing for activities related to strengthening, flexibility, endurance, ROM  for improvements in scapular, scapulothoracic and UE control with self care, reaching, carrying, lifting, house/yardwork, driving/computer work.    [] (89683) Provided verbal/tactile cueing for activities related to improving balance, coordination, kinesthetic sense, posture, motor skill, proprioception  to assist with  scapular, scapulothoracic and UE control with self care, reaching, carrying, lifting, house/yardwork, driving/computer work. Therapeutic Activities:    [] (66701 or 24303) Provided verbal/tactile cueing for activities related to improving balance, coordination, kinesthetic sense, posture, motor skill, proprioception and motor activation to allow for proper function of scapular, scapulothoracic and UE control with self care, carrying, lifting, driving/computer work.      Home Exercise Program:    [x] (15627) Reviewed/Progressed HEP activities related to strengthening, flexibility, endurance, ROM of scapular, scapulothoracic and UE control with self care, reaching, carrying, lifting, house/yardwork, driving/computer work  [] (91124) Reviewed/Progressed HEP activities related to improving balance, coordination, kinesthetic sense, posture, motor skill, proprioception of scapular, scapulothoracic and UE control with self care, reaching, carrying, lifting, house/yardwork, driving/computer work      Manual Treatments:  PROM / STM / Oscillations-Mobs:  G-I, II, III, IV (PA's, Inf., Post.)  [x] (85704) Provided manual therapy to mobilize soft tissue/joints of cervical/CT, scapular GHJ and UE for the purpose of modulating pain, promoting relaxation,  increasing ROM, reducing/eliminating soft tissue swelling/inflammation/restriction, improving soft tissue extensibility and allowing for proper ROM for normal function with self care, reaching, carrying, lifting, house/yardwork, driving/computer work    Modalities:    Charges:  Timed Code Treatment Minutes: 54'   Total Treatment Minutes: 54'     [facility] time in/time out:     [] EVAL (LOW) 87636   [] EVAL (MOD) 00642   [] EVAL (HIGH) 12279   [] RE-EVAL     [x] KP(34269) x 2  [] IONTO  [x] NMR (38345) x  1  [x] VASO  [x] Manual (97829) x 1     [] Other:  [x] TA x     [] [facility] Traction (00317)  [] ES(attended) (15628)      [] ES (un) (01697):     GOALS:   Patient stated goal: No pain   [x]? Progressing: []? Met: []? Not Met: []? Adjusted     Therapist goals for Patient:   Short Term Goals: To be achieved in: 2 weeks  1. Independent in HEP and progression per patient tolerance, in order to prevent re-injury. []? Progressing: [x]? Met: []? Not Met: []? Adjusted  2. Patient will have a decrease in pain to facilitate improvement in movement, function, and ADLs as indicated by Functional Deficits. []? Progressing: [x]? Met: []? Not Met: []? Adjusted     Long Term Goals: To be achieved in: 12 weeks  1. Disability index score of 30% or less for the [facility] to assist with reaching prior level of function. []? Progressing: [x]? Met: []? Not Met: [x]? Adjusted 5% Q DASH  2. Patient will demonstrate increased AROM to WNL L shld to allow for proper joint functioning as indicated by patients Functional Deficits. []? Progressing: [x]? Met: []? Not Met: []? Adjusted  3. Patient will demonstrate an increase in Strength to 3+/5 or better to allow for proper functional mobility as indicated by patients Functional Deficits. []? Progressing: [x]? Met: []? Not Met: [x]? Adjusted 5/5 RTC strength  4. Patient will return to reaching overhead functional activities without increased symptoms or restriction. []? Progressing: [x]? Met: []? Not Met: []? Adjusted  5. Reaching behind back without pain(patient specific functional goal)    []? Progressing: [x]? Met: []? Not Met: []? Adjusted   6. Pt will be able to perform plyometric activity with shoulder without sx's or limitations. []? Progressing: [x]? Met: []? Not Met: []? Adjusted   Progression Towards Functional goals:  [x] Patient is progressing as expected towards functional goals listed. [] Progression is slowed due to complexities listed. [] Progression has been slowed due to co-morbidities.   [] Plan just implemented, too soon to assess goals progression  [] Other:     ASSESSMENT:  Improving activity tolerance; additional CKC performed today without painful sx's- pt requires continued skilled intervention to restore strength, improve proprioception and build functional endurance. Goals updated. Continue to progress per tolerance. Overall Progression Towards Functional goals/ Treatment Progress Update:  [x] Patient is progressing as expected towards functional goals listed. [] Progression is slowed due to complexities/Impairments listed. [] Progression has been slowed due to co-morbidities. [] Plan just implemented, too soon to assess goals progression <30days   [] Goals require adjustment due to lack of progress  [] Patient is not progressing as expected and requires additional follow up with physician  [] Other    Prognosis for POC: [x] Good [] Fair  [] Poor      Patient requires continued skilled intervention: [x] Yes  [] No    Treatment/Activity Tolerance:  [x] Patient able to complete treatment  [] Patient limited by fatigue  [] Patient limited by pain    [] Patient limited by other medical complications  [] Other:         Return to Play: (if applicable)   []  Stage 1: Intro to Strength   [x]  Stage 2: Return to Run and Strength   []  Stage 3: Return to Jump and Strength   []  Stage 4: Dynamic Strength and Agility   []  Stage 5: Sport Specific Training     []  Ready to Return to Play, Meets All Above Stages   []  Not Ready for Return to Sports   Comments:                               PLAN: See eval. PROM per protocol for Posterior Labral repair. 1-2x/week. Progress strengthening as tolerated.   [x] Continue per plan of care [] Alter current plan (see comments above)  [] Plan of care initiated [] Hold pending MD visit [] Discharge      Electronically signed by:  Rocky Reddy, PT, [id number]    Note: If patient does not return for scheduled/ recommended follow up visits, this note will serve as a discharge from care along with most recent update on progress.

## 2020-12-10 ENCOUNTER — HOSPITAL ENCOUNTER (OUTPATIENT)
Dept: PHYSICAL THERAPY | Age: 25
Setting detail: THERAPIES SERIES
Discharge: HOME | End: 2020-12-10
Payer: COMMERCIAL

## 2020-12-10 PROCEDURE — 97110 THERAPEUTIC EXERCISES: CPT | Performed by: PHYSICAL THERAPIST

## 2020-12-10 PROCEDURE — 97112 NEUROMUSCULAR REEDUCATION: CPT | Performed by: PHYSICAL THERAPIST

## 2020-12-10 NOTE — FLOWSHEET NOTE
[facility], [address] [place]  Phone: [phone]  Fax [phone]        Date:  12/10/2020    Patient Name:  Marc Watkins    :  1995  MRN: [id number]  Restrictions/Precautions:  Posterior labral Protocol. Sling  Medical/Treatment Diagnosis Information:  · Diagnosis: M25.312 (ICD-10-CM) - Shoulder instability, left , S43.432A (ICD-10-CM) - Anterior to posterior tear of superior glenoid labrum of left shoulder s/p L shld posterior labral repair on 20  · Treatment Diagnosis: M25.312 (ICD-10-CM) - Shoulder instability, left , Y13.355Q (ICD-10-CM) - Anterior to posterior tear of superior glenoid labrum of left shoulder s/p L shld posterior labral repair on 3/08/81  Insurance/Certification information:  PT Insurance Information: [employer]/60 PT/ no auth  Physician Information:  Referring Practitioner: Dr. Edmundo Pizano  Has the plan of care been signed (Y/N):        []  Yes  [x]  No     Date of Patient follow up with Physician: 11-3-2020      Is this a Progress Report:     [x]  Yes  []  No        If Yes:  Date Range for reporting period:  Beginnin2020  Ending 2020    Progress report will be due (10 Rx or 30 days whichever is less):       Recertification will be due (POC Duration  / 90 days whichever is less): 12 weeks      Visit # Insurance Allowable Auth Required   In-person 17 60PT []  Yes [x]  No    Telehealth   []  Yes []  No    Total            Functional Scale: Quick dash: 36% 27 score    Date assessed:  10/26/20      Therapy Diagnosis/Practice Pattern:4I      Number of Comorbidities:  []0     []1-2    []3    Latex Allergy:  [x]NO      []YES  Preferred Language for Healthcare:   [x]English       []other:         SUBJECTIVE:  Pt doing well. Pt perceives 90% improvement in overall condition.      OBJECTIVE:    Observation:     Test measurements:    OBJECTIVE  Test used Initial score 10/26/20 11/30/2020 12/10/2020   Pain Summary 0-10 7-10 0-3 0-2 0   Functional questionnaire Quick Dash 73% 36% 7% 0%   Functional Testing Y balance    R UE: 3', 3', 2.8'  L UE: 2.7', 2.6', 2.4'           ROM PROM Flex 80 150 WFL WFL    PROM scap  155-160      PROM Er  AROM ER 0 78   T3     IR   T11  T 12 (R)    Strength Flex/ Scaption   5- 5    ER   4 5    MT/rhomboids/ LT    4+/4+/5-       RESTRICTIONS/PRECAUTIONS:   Date of Procedure: 9/16/2020     Pre-Op Diagnosis: SHOULDER INSTABILITY, LEFT     Post-Op Diagnosis: Posterior labral tear, degenerative superior labral tear, low-grade articular sided fraying along the articular surface of the supraspinatus consistent with an i internal impingement syndrome       Procedure(s):  LEFT SHOULDER VIDEO ARTHROSCOPY WITH POSTERIOR LABRAL RECONSTRUCTION.  LIMITED DEBRIDEMENT ROTATOR CUFF.     #1.  Left shoulder arthroscopy with posterior labral repair; 52695  #2.  Left shoulder arthroscopy with a limited debridement including the articular surface of the supraspinatus and superior labrum;       Exercises/Interventions:     Therapeutic Ex (02915) Sets/sec Reps Notes/CUES   Airdyne bike  6-7 mins    T spine threading 3 5 B    Serratus wall slide (w/ OVL)    2 x 15 ea    OVL lat band walk (w/ BVL hip abd)  Clocks  6 laps  8 laps       TB AROM ER  GTB 3 x 10 At 90-90   Corner (s)- 3 way 30\" ea    Cane flexion 10\" 10 reps    TB ER/IR (GTB)- 90- 90 2 12 ea    TB row/ ext BTB  2 x 15 ea    SL ER 3# H5/ 2  15    SL sleeper (s) 30\" 5    Body blade: flex 90, ER 0, ABD 90 30\" 3 ea    Split stance SA row 20# 2 x 15    Shoulder ext (bar) 30# 2 x 15    Bent over lat pull (rope) 30# 2 x 15    IR strap (s)- horizontal to midback  +into IR H10 5 ea    Pushup on wall 10  3D    No money  BW/ 2  15 reps    Manual Intervention (64882)                                     NMR re-education (95230)   CUES NEEDED   Ball clocks on wall  Ball drops in prone 10 laps  20\"   3    Prone plank  With scap protract/ UE control with self care, reaching, carrying, lifting, house/yardwork, driving/computer work.    [] (91277) Provided verbal/tactile cueing for activities related to improving balance, coordination, kinesthetic sense, posture, motor skill, proprioception  to assist with  scapular, scapulothoracic and UE control with self care, reaching, carrying, lifting, house/yardwork, driving/computer work. Therapeutic Activities:    [] (27432 or 03498) Provided verbal/tactile cueing for activities related to improving balance, coordination, kinesthetic sense, posture, motor skill, proprioception and motor activation to allow for proper function of scapular, scapulothoracic and UE control with self care, carrying, lifting, driving/computer work.      Home Exercise Program:    [x] (92553) Reviewed/Progressed HEP activities related to strengthening, flexibility, endurance, ROM of scapular, scapulothoracic and UE control with self care, reaching, carrying, lifting, house/yardwork, driving/computer work  [] (33468) Reviewed/Progressed HEP activities related to improving balance, coordination, kinesthetic sense, posture, motor skill, proprioception of scapular, scapulothoracic and UE control with self care, reaching, carrying, lifting, house/yardwork, driving/computer work      Manual Treatments:  PROM / STM / Oscillations-Mobs:  G-I, II, III, IV (PA's, Inf., Post.)  [x] (39279) Provided manual therapy to mobilize soft tissue/joints of cervical/CT, scapular GHJ and UE for the purpose of modulating pain, promoting relaxation,  increasing ROM, reducing/eliminating soft tissue swelling/inflammation/restriction, improving soft tissue extensibility and allowing for proper ROM for normal function with self care, reaching, carrying, lifting, house/yardwork, driving/computer work    Modalities:    Charges:  Timed Code Treatment Minutes: 54'   Total Treatment Minutes: 54'     [facility] time in/time out:     [] EVAL (LOW) 07939   [] EVAL (MOD) 66454   [] EVAL (HIGH) 43984   [] RE-EVAL     [x] PS(54125) x 2  [] IONTO  [x] NMR (27965) x  1  [x] VASO  [x] Manual (88720) x 1     [] Other:  [x] TA x     [] Mech Traction (77212)  [] ES(attended) (45757)      [] ES (un) (25923):     GOALS:   Patient stated goal: No pain   [x]? Progressing: []? Met: []? Not Met: []? Adjusted     Therapist goals for Patient:   Short Term Goals: To be achieved in: 2 weeks  1. Independent in HEP and progression per patient tolerance, in order to prevent re-injury. []? Progressing: [x]? Met: []? Not Met: []? Adjusted  2. Patient will have a decrease in pain to facilitate improvement in movement, function, and ADLs as indicated by Functional Deficits. []? Progressing: [x]? Met: []? Not Met: []? Adjusted     Long Term Goals: To be achieved in: 12 weeks  1. Disability index score of 30% or less for the [facility] to assist with reaching prior level of function. []? Progressing: [x]? Met: []? Not Met: [x]? Adjusted 5% Q DASH- MET  2. Patient will demonstrate increased AROM to WNL L shld to allow for proper joint functioning as indicated by patients Functional Deficits. []? Progressing: [x]? Met: []? Not Met: []? Adjusted  3. Patient will demonstrate an increase in Strength to 3+/5 or better to allow for proper functional mobility as indicated by patients Functional Deficits. []? Progressing: [x]? Met: []? Not Met: [x]? Adjusted 5/5 RTC strength- MET  4. Patient will return to reaching overhead functional activities without increased symptoms or restriction. []? Progressing: [x]? Met: []? Not Met: []? Adjusted  5. Reaching behind back without pain(patient specific functional goal)    []? Progressing: [x]? Met: []? Not Met: []? Adjusted   6. Pt will be able to perform plyometric activity with shoulder without sx's or limitations. []? Progressing: [x]? Met: []? Not Met: []?  Adjusted   Progression Towards Functional goals:  [x] Patient is progressing as expected towards

## (undated) DEVICE — SUTURE LABRALTAPE L36IN DIA1.5MM NONABSORBABLE WHT POLY AR7276

## (undated) DEVICE — STERILE LATEX POWDER-FREE SURGICAL GLOVESWITH NITRILE COATING: Brand: PROTEXIS

## (undated) DEVICE — PAD FELT FOR ARTHROVAC FLR SUCT SYS

## (undated) DEVICE — SHOULDER STABILIZATION KIT,                                    DISPOSABLE 12 PER BOX

## (undated) DEVICE — MEDI-VAC NON-CONDUCTIVE SUCTION TUBING: Brand: CARDINAL HEALTH

## (undated) DEVICE — COVER BOOT THK2MIL UNIV POLYETH IMPERMEABLE FLD RESIST DISP

## (undated) DEVICE — STERILE POLYISOPRENE POWDER-FREE SURGICAL GLOVES: Brand: PROTEXIS

## (undated) DEVICE — Device

## (undated) DEVICE — 5.5 MM FULL RADIUS STRAIGHT                                    BLADES, POWER/EP-1, ORANGE, PACKAGED                                    6 PER BOX, STERILE

## (undated) DEVICE — PASSER SUT 90DEG STR BLU MFIL SHUTTLING SUT REELPASS

## (undated) DEVICE — SUTURE ETHLN SZ 3-0 L18IN NONABSORBABLE BLK PS-2 L19MM 3/8 1669H

## (undated) DEVICE — TUBING PMP IRRIG GOFLO

## (undated) DEVICE — 3M™ MEDIPORE™ SOFT CLOTH TAPE, 4 INCH X 10 YARDS, 12 ROLLS/CASE, 2964: Brand: 3M™ MEDIPORE™

## (undated) DEVICE — CANNULA SMOOTH FLEX 8.0 X 72MM: Brand: CLEAR-TRAC

## (undated) DEVICE — MANIFOLD SURG NEPTUNE WST MGMT

## (undated) DEVICE — Z DUP USE 2635506 SODIUM CHL 0.9% IRRIG